# Patient Record
Sex: FEMALE | Race: WHITE | NOT HISPANIC OR LATINO | Employment: OTHER | URBAN - METROPOLITAN AREA
[De-identification: names, ages, dates, MRNs, and addresses within clinical notes are randomized per-mention and may not be internally consistent; named-entity substitution may affect disease eponyms.]

---

## 2017-01-24 ENCOUNTER — APPOINTMENT (OUTPATIENT)
Dept: WOMENS IMAGING | Facility: HOSPITAL | Age: 67
End: 2017-01-24

## 2017-01-24 PROCEDURE — 77080 DXA BONE DENSITY AXIAL: CPT

## 2017-01-24 PROCEDURE — 77063 BREAST TOMOSYNTHESIS BI: CPT | Performed by: RADIOLOGY

## 2017-01-24 PROCEDURE — 77080 DXA BONE DENSITY AXIAL: CPT | Performed by: RADIOLOGY

## 2017-01-24 PROCEDURE — G0202 SCR MAMMO BI INCL CAD: HCPCS | Performed by: RADIOLOGY

## 2017-08-16 DIAGNOSIS — Z71.84 TRAVEL ADVICE ENCOUNTER: Primary | ICD-10-CM

## 2017-08-16 DIAGNOSIS — Z71.84 TRAVEL ADVICE ENCOUNTER: ICD-10-CM

## 2017-08-16 RX ORDER — CIPROFLOXACIN 500 MG/1
500 TABLET, FILM COATED ORAL 2 TIMES DAILY
Qty: 20 TABLET | Refills: 0 | Status: SHIPPED | OUTPATIENT
Start: 2017-08-16 | End: 2017-09-22

## 2017-08-16 RX ORDER — CIPROFLOXACIN 500 MG/1
500 TABLET, FILM COATED ORAL 2 TIMES DAILY
Qty: 20 TABLET | Refills: 0 | Status: SHIPPED | OUTPATIENT
Start: 2017-08-16 | End: 2017-08-16 | Stop reason: SDUPTHER

## 2017-08-30 RX ORDER — OMEPRAZOLE 20 MG/1
CAPSULE, DELAYED RELEASE ORAL
Qty: 90 CAPSULE | Refills: 1 | Status: SHIPPED | OUTPATIENT
Start: 2017-08-30 | End: 2018-01-16 | Stop reason: SDUPTHER

## 2017-09-12 RX ORDER — CITALOPRAM 20 MG/1
TABLET ORAL
Qty: 90 TABLET | Refills: 1 | OUTPATIENT
Start: 2017-09-12

## 2017-09-22 ENCOUNTER — OFFICE VISIT (OUTPATIENT)
Dept: FAMILY MEDICINE CLINIC | Facility: CLINIC | Age: 67
End: 2017-09-22

## 2017-09-22 VITALS
WEIGHT: 143 LBS | OXYGEN SATURATION: 98 % | DIASTOLIC BLOOD PRESSURE: 64 MMHG | BODY MASS INDEX: 23.82 KG/M2 | HEIGHT: 65 IN | HEART RATE: 64 BPM | TEMPERATURE: 97.6 F | SYSTOLIC BLOOD PRESSURE: 110 MMHG

## 2017-09-22 DIAGNOSIS — Z23 NEED FOR PNEUMOCOCCAL VACCINATION: ICD-10-CM

## 2017-09-22 DIAGNOSIS — Z23 NEED FOR INFLUENZA VACCINATION: ICD-10-CM

## 2017-09-22 DIAGNOSIS — F41.8 MIXED ANXIETY DEPRESSIVE DISORDER: Primary | ICD-10-CM

## 2017-09-22 DIAGNOSIS — F51.04 CHRONIC INSOMNIA: ICD-10-CM

## 2017-09-22 PROCEDURE — 90670 PCV13 VACCINE IM: CPT | Performed by: NURSE PRACTITIONER

## 2017-09-22 PROCEDURE — 99214 OFFICE O/P EST MOD 30 MIN: CPT | Performed by: NURSE PRACTITIONER

## 2017-09-22 PROCEDURE — G0008 ADMIN INFLUENZA VIRUS VAC: HCPCS | Performed by: NURSE PRACTITIONER

## 2017-09-22 PROCEDURE — G0009 ADMIN PNEUMOCOCCAL VACCINE: HCPCS | Performed by: NURSE PRACTITIONER

## 2017-09-22 RX ORDER — CLONAZEPAM 0.5 MG/1
TABLET ORAL
Qty: 30 TABLET | Refills: 0 | Status: SHIPPED | OUTPATIENT
Start: 2017-09-22 | End: 2018-12-06 | Stop reason: SDUPTHER

## 2017-09-22 RX ORDER — CITALOPRAM 20 MG/1
20 TABLET ORAL DAILY
Qty: 90 TABLET | Refills: 3 | Status: SHIPPED | OUTPATIENT
Start: 2017-09-22 | End: 2017-12-18 | Stop reason: SDUPTHER

## 2017-09-22 NOTE — PROGRESS NOTES
"Subjective   Marianne Carrera is a 66 y.o. female.     History of Present Illness   Marianne Carrera 66 y.o. female presents for follow up of insomnia with onset of symptoms years ago. Patient describes symptoms as difficulty falling asleep. Patient has found complete relief with prescription sleep aid, Clonazepam. Associated symptoms include: fatigue if unable to take Rx . Patient denies daytime somnolence Symptoms have stabilized.  The patient has failed multiple OTC medications for insomnia.  They are well controlled on current Rx and will continue to try to take Rx PRN.      Also needing Celexa refill which she's taking daily as directed without any side effects and working well to control her anxiety and depression  The following portions of the patient's history were reviewed and updated as appropriate: allergies, current medications, past social history and problem list.    Review of Systems   Constitutional: Negative for fever.   All other systems reviewed and are negative.      Objective   /64 (BP Location: Left arm, Patient Position: Sitting)  Pulse 64  Temp 97.6 °F (36.4 °C)  Ht 65\" (165.1 cm)  Wt 143 lb (64.9 kg)  SpO2 98%  BMI 23.8 kg/m2  Physical Exam   Constitutional: She is oriented to person, place, and time. Vital signs are normal. She appears well-developed and well-nourished. No distress.   HENT:   Head: Normocephalic.   Cardiovascular: Normal rate, regular rhythm and normal heart sounds.    Pulmonary/Chest: Effort normal and breath sounds normal.   Neurological: She is alert and oriented to person, place, and time. Gait normal.   Psychiatric: She has a normal mood and affect. Her behavior is normal. Judgment and thought content normal.   Vitals reviewed.  The patient has read and signed the [facility] Controlled Substance Contract.  I will continue to see patient for regular follow up appointments.  They are well controlled on their medication.  HENRI has been reviewed by me and is " updated every 3 months. The patient is aware of the potential for addiction and dependence.      Assessment/Plan   Problem List Items Addressed This Visit        Other    Mixed anxiety depressive disorder - Primary    Relevant Medications    citalopram (CeleXA) 20 MG tablet    Chronic insomnia    Relevant Medications    clonazePAM (KLONOPIN) 0.5 MG tablet        rto in 3 mons

## 2017-10-20 VITALS
HEART RATE: 83 BPM | RESPIRATION RATE: 13 BRPM | WEIGHT: 144 LBS | OXYGEN SATURATION: 95 % | SYSTOLIC BLOOD PRESSURE: 144 MMHG | DIASTOLIC BLOOD PRESSURE: 100 MMHG | SYSTOLIC BLOOD PRESSURE: 148 MMHG | HEIGHT: 66 IN | OXYGEN SATURATION: 98 % | SYSTOLIC BLOOD PRESSURE: 138 MMHG | DIASTOLIC BLOOD PRESSURE: 82 MMHG | OXYGEN SATURATION: 84 % | OXYGEN SATURATION: 97 % | RESPIRATION RATE: 26 BRPM | HEART RATE: 69 BPM | OXYGEN SATURATION: 100 % | HEART RATE: 66 BPM | TEMPERATURE: 98 F | RESPIRATION RATE: 11 BRPM | HEART RATE: 85 BPM | DIASTOLIC BLOOD PRESSURE: 64 MMHG | RESPIRATION RATE: 16 BRPM | OXYGEN SATURATION: 96 % | SYSTOLIC BLOOD PRESSURE: 145 MMHG | SYSTOLIC BLOOD PRESSURE: 141 MMHG | SYSTOLIC BLOOD PRESSURE: 107 MMHG | DIASTOLIC BLOOD PRESSURE: 85 MMHG | DIASTOLIC BLOOD PRESSURE: 88 MMHG | DIASTOLIC BLOOD PRESSURE: 68 MMHG | RESPIRATION RATE: 20 BRPM | HEART RATE: 71 BPM | RESPIRATION RATE: 23 BRPM | SYSTOLIC BLOOD PRESSURE: 139 MMHG | DIASTOLIC BLOOD PRESSURE: 73 MMHG | DIASTOLIC BLOOD PRESSURE: 86 MMHG | HEART RATE: 65 BPM | RESPIRATION RATE: 17 BRPM | HEART RATE: 73 BPM | SYSTOLIC BLOOD PRESSURE: 171 MMHG | DIASTOLIC BLOOD PRESSURE: 93 MMHG | TEMPERATURE: 97.5 F | HEART RATE: 74 BPM

## 2017-10-23 ENCOUNTER — AMBULATORY SURGICAL CENTER (AMBULATORY)
Dept: URBAN - METROPOLITAN AREA SURGERY 17 | Facility: SURGERY | Age: 67
End: 2017-10-23
Payer: MEDICARE

## 2017-10-23 DIAGNOSIS — K57.30 DIVERTICULOSIS OF LARGE INTESTINE WITHOUT PERFORATION OR ABS: ICD-10-CM

## 2017-10-23 DIAGNOSIS — Z12.11 ENCOUNTER FOR SCREENING FOR MALIGNANT NEOPLASM OF COLON: ICD-10-CM

## 2017-10-23 DIAGNOSIS — K21.9 GASTRO-ESOPHAGEAL REFLUX DISEASE WITHOUT ESOPHAGITIS: ICD-10-CM

## 2017-10-23 DIAGNOSIS — K44.9 DIAPHRAGMATIC HERNIA WITHOUT OBSTRUCTION OR GANGRENE: ICD-10-CM

## 2017-10-23 PROCEDURE — G0121 COLON CA SCRN NOT HI RSK IND: HCPCS

## 2017-10-23 PROCEDURE — 43235 EGD DIAGNOSTIC BRUSH WASH: CPT

## 2017-10-23 RX ADMIN — LIDOCAINE HYDROCHLORIDE 50 MG: 10 INJECTION, SOLUTION EPIDURAL; INFILTRATION; INTRACAUDAL; PERINEURAL at 12:04

## 2017-10-23 RX ADMIN — PROPOFOL 75 MG: 10 INJECTION, EMULSION INTRAVENOUS at 12:04

## 2017-10-23 RX ADMIN — PROPOFOL 50 MG: 10 INJECTION, EMULSION INTRAVENOUS at 12:11

## 2017-10-23 RX ADMIN — PROPOFOL 25 MG: 10 INJECTION, EMULSION INTRAVENOUS at 12:08

## 2017-10-23 RX ADMIN — PROPOFOL 50 MG: 10 INJECTION, EMULSION INTRAVENOUS at 12:05

## 2017-12-18 DIAGNOSIS — F41.8 MIXED ANXIETY DEPRESSIVE DISORDER: ICD-10-CM

## 2017-12-19 RX ORDER — CITALOPRAM 20 MG/1
TABLET ORAL
Qty: 90 TABLET | Refills: 1 | Status: SHIPPED | OUTPATIENT
Start: 2017-12-19 | End: 2018-05-08 | Stop reason: SDUPTHER

## 2017-12-22 ENCOUNTER — TELEPHONE (OUTPATIENT)
Dept: FAMILY MEDICINE CLINIC | Facility: CLINIC | Age: 67
End: 2017-12-22

## 2017-12-22 RX ORDER — AMOXICILLIN 875 MG/1
875 TABLET, COATED ORAL 2 TIMES DAILY
Qty: 20 TABLET | Refills: 0 | Status: SHIPPED | OUTPATIENT
Start: 2017-12-22 | End: 2018-12-06

## 2017-12-22 NOTE — TELEPHONE ENCOUNTER
Patient left message asking if she can get a prescription for a steroid or antibiotic for her sore throat?

## 2018-01-17 RX ORDER — OMEPRAZOLE 20 MG/1
CAPSULE, DELAYED RELEASE ORAL
Qty: 90 CAPSULE | Refills: 1 | Status: SHIPPED | OUTPATIENT
Start: 2018-01-17 | End: 2018-06-11 | Stop reason: SDUPTHER

## 2018-04-17 ENCOUNTER — APPOINTMENT (OUTPATIENT)
Dept: WOMENS IMAGING | Facility: HOSPITAL | Age: 68
End: 2018-04-17

## 2018-04-17 PROCEDURE — 77066 DX MAMMO INCL CAD BI: CPT | Performed by: RADIOLOGY

## 2018-04-17 PROCEDURE — G0279 TOMOSYNTHESIS, MAMMO: HCPCS | Performed by: RADIOLOGY

## 2018-04-17 PROCEDURE — 76641 ULTRASOUND BREAST COMPLETE: CPT | Performed by: RADIOLOGY

## 2018-05-08 DIAGNOSIS — F41.8 MIXED ANXIETY DEPRESSIVE DISORDER: ICD-10-CM

## 2018-05-09 RX ORDER — CITALOPRAM 20 MG/1
TABLET ORAL
Qty: 90 TABLET | Refills: 1 | Status: SHIPPED | OUTPATIENT
Start: 2018-05-09 | End: 2018-12-06 | Stop reason: SDUPTHER

## 2018-06-12 RX ORDER — OMEPRAZOLE 20 MG/1
CAPSULE, DELAYED RELEASE ORAL
Qty: 90 CAPSULE | Refills: 1 | Status: SHIPPED | OUTPATIENT
Start: 2018-06-12 | End: 2018-12-06 | Stop reason: SDUPTHER

## 2018-09-10 ENCOUNTER — TELEPHONE (OUTPATIENT)
Dept: FAMILY MEDICINE CLINIC | Facility: CLINIC | Age: 68
End: 2018-09-10

## 2018-09-10 RX ORDER — CIPROFLOXACIN 500 MG/1
500 TABLET, FILM COATED ORAL 2 TIMES DAILY
Qty: 20 TABLET | Refills: 0 | Status: SHIPPED | OUTPATIENT
Start: 2018-09-10 | End: 2018-12-06

## 2018-09-10 NOTE — TELEPHONE ENCOUNTER
Pt is leaving [facility] next week. Pt requesting antibiotics to take with them incase they get sick. Pt mentioned that this was done last year.

## 2018-10-08 DIAGNOSIS — F41.8 MIXED ANXIETY DEPRESSIVE DISORDER: ICD-10-CM

## 2018-10-09 RX ORDER — CITALOPRAM 20 MG/1
TABLET ORAL
Qty: 90 TABLET | Refills: 1 | OUTPATIENT
Start: 2018-10-09

## 2018-11-27 DIAGNOSIS — F41.8 MIXED ANXIETY DEPRESSIVE DISORDER: ICD-10-CM

## 2018-11-27 RX ORDER — OMEPRAZOLE 20 MG/1
CAPSULE, DELAYED RELEASE ORAL
Qty: 90 CAPSULE | Refills: 1 | OUTPATIENT
Start: 2018-11-27

## 2018-11-27 RX ORDER — CITALOPRAM 20 MG/1
TABLET ORAL
Qty: 90 TABLET | Refills: 1 | OUTPATIENT
Start: 2018-11-27

## 2018-12-06 ENCOUNTER — OFFICE VISIT (OUTPATIENT)
Dept: FAMILY MEDICINE CLINIC | Facility: CLINIC | Age: 68
End: 2018-12-06

## 2018-12-06 VITALS
TEMPERATURE: 98.5 F | DIASTOLIC BLOOD PRESSURE: 86 MMHG | BODY MASS INDEX: 23.02 KG/M2 | HEIGHT: 67 IN | SYSTOLIC BLOOD PRESSURE: 138 MMHG | HEART RATE: 62 BPM | OXYGEN SATURATION: 99 % | WEIGHT: 146.7 LBS

## 2018-12-06 DIAGNOSIS — K21.9 GASTROESOPHAGEAL REFLUX DISEASE WITHOUT ESOPHAGITIS: Primary | ICD-10-CM

## 2018-12-06 DIAGNOSIS — M19.90 ARTHRITIS: ICD-10-CM

## 2018-12-06 DIAGNOSIS — Z13.0 SCREENING FOR DISORDER OF BLOOD AND BLOOD-FORMING ORGANS: ICD-10-CM

## 2018-12-06 DIAGNOSIS — R53.83 FATIGUE, UNSPECIFIED TYPE: ICD-10-CM

## 2018-12-06 DIAGNOSIS — E78.5 HYPERLIPIDEMIA, UNSPECIFIED HYPERLIPIDEMIA TYPE: ICD-10-CM

## 2018-12-06 DIAGNOSIS — F41.8 MIXED ANXIETY DEPRESSIVE DISORDER: ICD-10-CM

## 2018-12-06 DIAGNOSIS — F51.04 CHRONIC INSOMNIA: ICD-10-CM

## 2018-12-06 LAB
ALBUMIN SERPL-MCNC: 4.5 G/DL (ref 3.5–5.2)
ALBUMIN/GLOB SERPL: 1.9 G/DL
ALP SERPL-CCNC: 57 U/L (ref 39–117)
ALT SERPL-CCNC: 15 U/L (ref 1–33)
AST SERPL-CCNC: 23 U/L (ref 1–32)
BASOPHILS # BLD AUTO: 0.03 10*3/MM3 (ref 0–0.2)
BASOPHILS NFR BLD AUTO: 0.8 % (ref 0–1.5)
BILIRUB SERPL-MCNC: 0.4 MG/DL (ref 0.1–1.2)
BUN SERPL-MCNC: 13 MG/DL (ref 8–23)
BUN/CREAT SERPL: 15.1 (ref 7–25)
CALCIUM SERPL-MCNC: 10.1 MG/DL (ref 8.6–10.5)
CHLORIDE SERPL-SCNC: 101 MMOL/L (ref 98–107)
CHOLEST SERPL-MCNC: 280 MG/DL (ref 0–200)
CO2 SERPL-SCNC: 30.4 MMOL/L (ref 22–29)
CREAT SERPL-MCNC: 0.86 MG/DL (ref 0.57–1)
EOSINOPHIL # BLD AUTO: 0.14 10*3/MM3 (ref 0–0.7)
EOSINOPHIL NFR BLD AUTO: 3.6 % (ref 0.3–6.2)
ERYTHROCYTE [DISTWIDTH] IN BLOOD BY AUTOMATED COUNT: 13.4 % (ref 11.7–13)
GLOBULIN SER CALC-MCNC: 2.4 GM/DL
GLUCOSE SERPL-MCNC: 80 MG/DL (ref 65–99)
HCT VFR BLD AUTO: 45.2 % (ref 35.6–45.5)
HDLC SERPL-MCNC: 86 MG/DL (ref 40–60)
HGB BLD-MCNC: 14.2 G/DL (ref 11.9–15.5)
IMM GRANULOCYTES # BLD: 0 10*3/MM3 (ref 0–0.03)
IMM GRANULOCYTES NFR BLD: 0 % (ref 0–0.5)
LDLC SERPL CALC-MCNC: 173 MG/DL (ref 0–100)
LDLC/HDLC SERPL: 2.01 {RATIO}
LYMPHOCYTES # BLD AUTO: 1.39 10*3/MM3 (ref 0.9–4.8)
LYMPHOCYTES NFR BLD AUTO: 36.2 % (ref 19.6–45.3)
MCH RBC QN AUTO: 32.9 PG (ref 26.9–32)
MCHC RBC AUTO-ENTMCNC: 31.4 G/DL (ref 32.4–36.3)
MCV RBC AUTO: 104.6 FL (ref 80.5–98.2)
MONOCYTES # BLD AUTO: 0.45 10*3/MM3 (ref 0.2–1.2)
MONOCYTES NFR BLD AUTO: 11.7 % (ref 5–12)
NEUTROPHILS # BLD AUTO: 1.83 10*3/MM3 (ref 1.9–8.1)
NEUTROPHILS NFR BLD AUTO: 47.7 % (ref 42.7–76)
PLATELET # BLD AUTO: 271 10*3/MM3 (ref 140–500)
POTASSIUM SERPL-SCNC: 4.7 MMOL/L (ref 3.5–5.2)
PROT SERPL-MCNC: 6.9 G/DL (ref 6–8.5)
RBC # BLD AUTO: 4.32 10*6/MM3 (ref 3.9–5.2)
SODIUM SERPL-SCNC: 142 MMOL/L (ref 136–145)
TRIGL SERPL-MCNC: 106 MG/DL (ref 0–150)
TSH SERPL DL<=0.005 MIU/L-ACNC: 1.55 MIU/ML (ref 0.27–4.2)
VLDLC SERPL CALC-MCNC: 21.2 MG/DL (ref 5–40)
WBC # BLD AUTO: 3.84 10*3/MM3 (ref 4.5–10.7)

## 2018-12-06 PROCEDURE — 99214 OFFICE O/P EST MOD 30 MIN: CPT | Performed by: NURSE PRACTITIONER

## 2018-12-06 RX ORDER — CLONAZEPAM 0.5 MG/1
TABLET ORAL
Qty: 30 TABLET | Refills: 0 | Status: SHIPPED | OUTPATIENT
Start: 2018-12-06 | End: 2022-08-11

## 2018-12-06 RX ORDER — CITALOPRAM 20 MG/1
20 TABLET ORAL DAILY
Qty: 90 TABLET | Refills: 3 | Status: SHIPPED | OUTPATIENT
Start: 2018-12-06 | End: 2019-10-11 | Stop reason: SDUPTHER

## 2018-12-06 RX ORDER — OMEPRAZOLE 20 MG/1
20 CAPSULE, DELAYED RELEASE ORAL DAILY
Qty: 90 CAPSULE | Refills: 3 | Status: SHIPPED | OUTPATIENT
Start: 2018-12-06 | End: 2019-10-11 | Stop reason: SDUPTHER

## 2018-12-06 RX ORDER — MELOXICAM 15 MG/1
15 TABLET ORAL DAILY
Qty: 90 TABLET | Refills: 3 | Status: SHIPPED | OUTPATIENT
Start: 2018-12-06 | End: 2019-10-11 | Stop reason: SDUPTHER

## 2018-12-06 NOTE — PROGRESS NOTES
"Subjective   Marianne Carrera is a 68 y.o. female.     History of Present Illness   Marianne Carrera 68 y.o. female who presents today for routine follow up check and medication refills.  she has a history of   Patient Active Problem List   Diagnosis   • Carpal tunnel syndrome   • Mixed anxiety depressive disorder   • Gastroesophageal reflux disease   • Hyperlipidemia   • Chronic insomnia   • Menopause present   • Osteopenia   • Raynaud's phenomenon   • Screening for disorder of blood and blood-forming organs   • Fatigue   • Arthritis   .  Since the last visit, she has overall felt well.  She has GERD and is well controlled on PPI medication and anxiety is under control, insomnia is under control..  she has been compliant with current medications have reviewed them.  The patient denies medication side effects.  Having bone aches in the morning but after gets up feels better within an hour. Nottried any medications    Having fatigue and wanting tsh checked    Results for orders placed or performed in visit on 04/18/16   POCT Urinalysis Dipstick, Automated   Result Value Ref Range    Color Yellow Yellow, Straw, Dark Yellow, Michelle    Clarity, UA Clear Clear    Glucose, UA Negative Negative mg/dL    Bilirubin Negative Negative    Ketones, UA Negative Negative    Specific Gravity  1.015 1.005 - 1.030    Blood, UA Negative Negative    pH, Urine 5.5 5.0 - 8.0    Protein, POC Negative Negative mg/dL    Urobilinogen, UA Normal Normal    Leukocytes Negative Negative    Nitrite, UA Negative Negative         The following portions of the patient's history were reviewed and updated as appropriate: allergies, current medications, past social history and problem list.    Review of Systems   All other systems reviewed and are negative.      Objective   /86 (BP Location: Right arm, Patient Position: Sitting)   Pulse 62   Temp 98.5 °F (36.9 °C)   Ht 170.2 cm (67\")   Wt 66.5 kg (146 lb 11.2 oz)   SpO2 99%   BMI 22.98 kg/m² "   Physical Exam   Constitutional: She is oriented to person, place, and time. Vital signs are normal. She appears well-developed and well-nourished. No distress.   HENT:   Head: Normocephalic.   Cardiovascular: Normal rate, regular rhythm and normal heart sounds.   Pulmonary/Chest: Effort normal and breath sounds normal.   Neurological: She is alert and oriented to person, place, and time. Gait normal.   Psychiatric: She has a normal mood and affect. Her behavior is normal. Judgment and thought content normal.   Vitals reviewed.      Assessment/Plan      Diagnosis Plan   1. Gastroesophageal reflux disease without esophagitis  omeprazole (priLOSEC) 20 MG capsule   2. Chronic insomnia  clonazePAM (KLONOPIN) 0.5 MG tablet   3. Mixed anxiety depressive disorder  citalopram (CeleXA) 20 MG tablet   4. Hyperlipidemia, unspecified hyperlipidemia type  Comprehensive Metabolic Panel    Lipid Panel With LDL / HDL Ratio   5. Screening for disorder of blood and blood-forming organs  CBC & Differential   6. Fatigue, unspecified type  TSH   7. Arthritis  meloxicam (MOBIC) 15 MG tablet     Follow up after labs   Cont same with gloria Wilkes, KETTY  12/6/2018

## 2018-12-07 RX ORDER — SIMVASTATIN 20 MG
20 TABLET ORAL NIGHTLY
Qty: 90 TABLET | Refills: 3 | Status: SHIPPED | OUTPATIENT
Start: 2018-12-07 | End: 2019-10-11 | Stop reason: SDUPTHER

## 2019-03-21 ENCOUNTER — TELEPHONE (OUTPATIENT)
Dept: FAMILY MEDICINE CLINIC | Facility: CLINIC | Age: 69
End: 2019-03-21

## 2019-03-21 DIAGNOSIS — Z78.0 POST-MENOPAUSAL: ICD-10-CM

## 2019-03-21 DIAGNOSIS — Z13.820 OSTEOPOROSIS SCREENING: Primary | ICD-10-CM

## 2019-03-21 NOTE — TELEPHONE ENCOUNTER
What diagnosis do I use? She does not have a history of osteoporosis or osteopenia and medicare wont cover osteoporosis screening

## 2019-03-21 NOTE — TELEPHONE ENCOUNTER
Pt is scheduled on 4/18 at the [facility] for pt's routine mammogram. Is it possible for Shan to authorize a bone density order as well and pt can get both those tests done on the same day?    If authorized, please fax order to [phone]

## 2019-04-19 ENCOUNTER — APPOINTMENT (OUTPATIENT)
Dept: WOMENS IMAGING | Facility: HOSPITAL | Age: 69
End: 2019-04-19

## 2019-04-19 PROCEDURE — 77063 BREAST TOMOSYNTHESIS BI: CPT | Performed by: RADIOLOGY

## 2019-04-19 PROCEDURE — 77067 SCR MAMMO BI INCL CAD: CPT | Performed by: RADIOLOGY

## 2019-04-19 PROCEDURE — 77080 DXA BONE DENSITY AXIAL: CPT | Performed by: RADIOLOGY

## 2019-10-11 DIAGNOSIS — M19.90 ARTHRITIS: ICD-10-CM

## 2019-10-11 DIAGNOSIS — F41.8 MIXED ANXIETY DEPRESSIVE DISORDER: ICD-10-CM

## 2019-10-11 DIAGNOSIS — K21.9 GASTROESOPHAGEAL REFLUX DISEASE WITHOUT ESOPHAGITIS: ICD-10-CM

## 2019-10-11 RX ORDER — CITALOPRAM 20 MG/1
TABLET ORAL
Qty: 90 TABLET | Refills: 3 | Status: SHIPPED | OUTPATIENT
Start: 2019-10-11 | End: 2020-08-13

## 2019-10-11 RX ORDER — SIMVASTATIN 20 MG
TABLET ORAL
Qty: 90 TABLET | Refills: 3 | Status: SHIPPED | OUTPATIENT
Start: 2019-10-11 | End: 2019-12-12

## 2019-10-11 RX ORDER — MELOXICAM 15 MG/1
TABLET ORAL
Qty: 90 TABLET | Refills: 3 | Status: SHIPPED | OUTPATIENT
Start: 2019-10-11 | End: 2019-12-12

## 2019-10-11 RX ORDER — OMEPRAZOLE 20 MG/1
CAPSULE, DELAYED RELEASE ORAL
Qty: 90 CAPSULE | Refills: 3 | Status: SHIPPED | OUTPATIENT
Start: 2019-10-11 | End: 2021-04-08

## 2019-12-12 ENCOUNTER — OFFICE VISIT (OUTPATIENT)
Dept: FAMILY MEDICINE CLINIC | Facility: CLINIC | Age: 69
End: 2019-12-12

## 2019-12-12 VITALS
HEIGHT: 64 IN | TEMPERATURE: 98.1 F | HEART RATE: 78 BPM | WEIGHT: 142 LBS | OXYGEN SATURATION: 98 % | DIASTOLIC BLOOD PRESSURE: 78 MMHG | BODY MASS INDEX: 24.24 KG/M2 | SYSTOLIC BLOOD PRESSURE: 136 MMHG

## 2019-12-12 DIAGNOSIS — Z13.1 SCREENING FOR DIABETES MELLITUS: ICD-10-CM

## 2019-12-12 DIAGNOSIS — G47.00 INSOMNIA, UNSPECIFIED TYPE: ICD-10-CM

## 2019-12-12 DIAGNOSIS — F41.8 MIXED ANXIETY DEPRESSIVE DISORDER: ICD-10-CM

## 2019-12-12 DIAGNOSIS — K21.9 GASTROESOPHAGEAL REFLUX DISEASE WITHOUT ESOPHAGITIS: ICD-10-CM

## 2019-12-12 DIAGNOSIS — Z13.0 SCREENING FOR IRON DEFICIENCY ANEMIA: ICD-10-CM

## 2019-12-12 DIAGNOSIS — E78.5 HYPERLIPIDEMIA, UNSPECIFIED HYPERLIPIDEMIA TYPE: Primary | ICD-10-CM

## 2019-12-12 PROCEDURE — 99214 OFFICE O/P EST MOD 30 MIN: CPT | Performed by: NURSE PRACTITIONER

## 2019-12-12 RX ORDER — DOXEPIN HYDROCHLORIDE 10 MG/1
10 CAPSULE ORAL NIGHTLY
Qty: 30 CAPSULE | Refills: 0 | Status: SHIPPED | OUTPATIENT
Start: 2019-12-12 | End: 2021-06-08

## 2019-12-12 NOTE — PROGRESS NOTES
"Subjective   Marianne Carrera is a 69 y.o. female.     History of Present Illness    Since the last visit, she has overall felt well.  She has GERD controlled on PPI Rx and anxiety is wellcontrolled, lipid med is causing leg muslce pain..  she has been compliant with current medications have reviewed them.  The patient denies medication side effects.  Will refill medications. /78   Pulse 78   Temp 98.1 °F (36.7 °C) (Oral)   Ht 162.6 cm (64\")   Wt 64.4 kg (142 lb)   SpO2 98%   BMI 24.37 kg/m²      She has been experiencing insomnia and in 2015 she used clonazepam for this and she would like a refill for it today.  Discussed with her at length about the clonazepam and not wanting to use that due to the fact that it is a controlled and possibly addictive medication patient is willing to try something else.    Results for orders placed or performed in visit on 12/06/18   Comprehensive Metabolic Panel   Result Value Ref Range    Glucose 80 65 - 99 mg/dL    BUN 13 8 - 23 mg/dL    Creatinine 0.86 0.57 - 1.00 mg/dL    eGFR Non African Am 66 >60 mL/min/1.73    eGFR African Am 80 >60 mL/min/1.73    BUN/Creatinine Ratio 15.1 7.0 - 25.0    Sodium 142 136 - 145 mmol/L    Potassium 4.7 3.5 - 5.2 mmol/L    Chloride 101 98 - 107 mmol/L    Total CO2 30.4 (H) 22.0 - 29.0 mmol/L    Calcium 10.1 8.6 - 10.5 mg/dL    Total Protein 6.9 6.0 - 8.5 g/dL    Albumin 4.50 3.50 - 5.20 g/dL    Globulin 2.4 gm/dL    A/G Ratio 1.9 g/dL    Total Bilirubin 0.4 0.1 - 1.2 mg/dL    Alkaline Phosphatase 57 39 - 117 U/L    AST (SGOT) 23 1 - 32 U/L    ALT (SGPT) 15 1 - 33 U/L   Lipid Panel With LDL / HDL Ratio   Result Value Ref Range    Total Cholesterol 280 (H) 0 - 200 mg/dL    Triglycerides 106 0 - 150 mg/dL    HDL Cholesterol 86 (H) 40 - 60 mg/dL    VLDL Cholesterol 21.2 5 - 40 mg/dL    LDL Cholesterol  173 (H) 0 - 100 mg/dL    LDL/HDL Ratio 2.01    TSH   Result Value Ref Range    TSH 1.550 0.270 - 4.200 mIU/mL   CBC & Differential   Result " "Value Ref Range    WBC 3.84 (L) 4.50 - 10.70 10*3/mm3    RBC 4.32 3.90 - 5.20 10*6/mm3    Hemoglobin 14.2 11.9 - 15.5 g/dL    Hematocrit 45.2 35.6 - 45.5 %    .6 (H) 80.5 - 98.2 fL    MCH 32.9 (H) 26.9 - 32.0 pg    MCHC 31.4 (L) 32.4 - 36.3 g/dL    RDW 13.4 (H) 11.7 - 13.0 %    Platelets 271 140 - 500 10*3/mm3    Neutrophil Rel % 47.7 42.7 - 76.0 %    Lymphocyte Rel % 36.2 19.6 - 45.3 %    Monocyte Rel % 11.7 5.0 - 12.0 %    Eosinophil Rel % 3.6 0.3 - 6.2 %    Basophil Rel % 0.8 0.0 - 1.5 %    Neutrophils Absolute 1.83 (L) 1.90 - 8.10 10*3/mm3    Lymphocytes Absolute 1.39 0.90 - 4.80 10*3/mm3    Monocytes Absolute 0.45 0.20 - 1.20 10*3/mm3    Eosinophils Absolute 0.14 0.00 - 0.70 10*3/mm3    Basophils Absolute 0.03 0.00 - 0.20 10*3/mm3    Immature Granulocyte Rel % 0.0 0.0 - 0.5 %    Immature Grans Absolute 0.00 0.00 - 0.03 10*3/mm3         The following portions of the patient's history were reviewed and updated as appropriate: allergies, current medications, past social history and problem list.    Review of Systems   Musculoskeletal: Positive for myalgias.   All other systems reviewed and are negative.      Objective   /78   Pulse 78   Temp 98.1 °F (36.7 °C) (Oral)   Ht 162.6 cm (64\")   Wt 64.4 kg (142 lb)   SpO2 98%   BMI 24.37 kg/m²   Physical Exam   Constitutional: She is oriented to person, place, and time. Vital signs are normal. She appears well-developed and well-nourished. No distress.   HENT:   Head: Normocephalic.   Cardiovascular: Normal rate, regular rhythm and normal heart sounds.   Pulmonary/Chest: Effort normal and breath sounds normal.   Neurological: She is alert and oriented to person, place, and time. Gait normal.   Psychiatric: She has a normal mood and affect. Her behavior is normal. Judgment and thought content normal.   Vitals reviewed.      Assessment/Plan      Diagnosis Plan   1. Hyperlipidemia, unspecified hyperlipidemia type  Comprehensive Metabolic Panel    Lipid " Panel With LDL / HDL Ratio   2. Gastroesophageal reflux disease without esophagitis     3. Mixed anxiety depressive disorder     4. Screening for iron deficiency anemia  CBC & Differential   5. Screening for diabetes mellitus  Comprehensive Metabolic Panel   6. Insomnia, unspecified type  doxepin (SINEquan) 10 MG capsule     Follow up after labs   Stop zocor and call in 3-4 weeks with results.  If muscle aches are better we will change meds.    Shan Wilkes, APRN  12/12/2019

## 2019-12-13 LAB
ALBUMIN SERPL-MCNC: 4.7 G/DL (ref 3.5–5.2)
ALBUMIN/GLOB SERPL: 2.1 G/DL
ALP SERPL-CCNC: 52 U/L (ref 39–117)
ALT SERPL-CCNC: 12 U/L (ref 1–33)
AST SERPL-CCNC: 18 U/L (ref 1–32)
BASOPHILS # BLD AUTO: 0.04 10*3/MM3 (ref 0–0.2)
BASOPHILS NFR BLD AUTO: 1 % (ref 0–1.5)
BILIRUB SERPL-MCNC: 0.4 MG/DL (ref 0.2–1.2)
BUN SERPL-MCNC: 21 MG/DL (ref 8–23)
BUN/CREAT SERPL: 24.4 (ref 7–25)
CALCIUM SERPL-MCNC: 9.6 MG/DL (ref 8.6–10.5)
CHLORIDE SERPL-SCNC: 100 MMOL/L (ref 98–107)
CHOLEST SERPL-MCNC: 214 MG/DL (ref 0–200)
CO2 SERPL-SCNC: 27.7 MMOL/L (ref 22–29)
CREAT SERPL-MCNC: 0.86 MG/DL (ref 0.57–1)
EOSINOPHIL # BLD AUTO: 0.2 10*3/MM3 (ref 0–0.4)
EOSINOPHIL NFR BLD AUTO: 5.1 % (ref 0.3–6.2)
ERYTHROCYTE [DISTWIDTH] IN BLOOD BY AUTOMATED COUNT: 12.7 % (ref 12.3–15.4)
GLOBULIN SER CALC-MCNC: 2.2 GM/DL
GLUCOSE SERPL-MCNC: 78 MG/DL (ref 65–99)
HCT VFR BLD AUTO: 41 % (ref 34–46.6)
HDLC SERPL-MCNC: 97 MG/DL (ref 40–60)
HGB BLD-MCNC: 14 G/DL (ref 12–15.9)
IMM GRANULOCYTES # BLD AUTO: 0.01 10*3/MM3 (ref 0–0.05)
IMM GRANULOCYTES NFR BLD AUTO: 0.3 % (ref 0–0.5)
LDLC SERPL CALC-MCNC: 104 MG/DL (ref 0–100)
LDLC/HDLC SERPL: 1.08 {RATIO}
LYMPHOCYTES # BLD AUTO: 1.33 10*3/MM3 (ref 0.7–3.1)
LYMPHOCYTES NFR BLD AUTO: 34.2 % (ref 19.6–45.3)
MCH RBC QN AUTO: 33.7 PG (ref 26.6–33)
MCHC RBC AUTO-ENTMCNC: 34.1 G/DL (ref 31.5–35.7)
MCV RBC AUTO: 98.6 FL (ref 79–97)
MONOCYTES # BLD AUTO: 0.52 10*3/MM3 (ref 0.1–0.9)
MONOCYTES NFR BLD AUTO: 13.4 % (ref 5–12)
NEUTROPHILS # BLD AUTO: 1.79 10*3/MM3 (ref 1.7–7)
NEUTROPHILS NFR BLD AUTO: 46 % (ref 42.7–76)
NRBC BLD AUTO-RTO: 0 /100 WBC (ref 0–0.2)
PLATELET # BLD AUTO: 211 10*3/MM3 (ref 140–450)
POTASSIUM SERPL-SCNC: 5.2 MMOL/L (ref 3.5–5.2)
PROT SERPL-MCNC: 6.9 G/DL (ref 6–8.5)
RBC # BLD AUTO: 4.16 10*6/MM3 (ref 3.77–5.28)
SODIUM SERPL-SCNC: 140 MMOL/L (ref 136–145)
TRIGL SERPL-MCNC: 63 MG/DL (ref 0–150)
VLDLC SERPL CALC-MCNC: 12.6 MG/DL
WBC # BLD AUTO: 3.89 10*3/MM3 (ref 3.4–10.8)

## 2020-03-13 ENCOUNTER — TELEPHONE (OUTPATIENT)
Dept: FAMILY MEDICINE CLINIC | Facility: CLINIC | Age: 70
End: 2020-03-13

## 2020-03-13 DIAGNOSIS — Z12.31 ENCOUNTER FOR SCREENING MAMMOGRAM FOR MALIGNANT NEOPLASM OF BREAST: ICD-10-CM

## 2020-03-13 DIAGNOSIS — Z12.39 BREAST CANCER SCREENING: Primary | ICD-10-CM

## 2020-03-13 NOTE — TELEPHONE ENCOUNTER
Pt called for an order for her mammogram. She goes to [facility]

## 2020-06-01 ENCOUNTER — APPOINTMENT (OUTPATIENT)
Dept: WOMENS IMAGING | Facility: HOSPITAL | Age: 70
End: 2020-06-01

## 2020-06-01 PROCEDURE — 77067 SCR MAMMO BI INCL CAD: CPT | Performed by: RADIOLOGY

## 2020-06-01 PROCEDURE — 77063 BREAST TOMOSYNTHESIS BI: CPT | Performed by: RADIOLOGY

## 2020-08-13 DIAGNOSIS — F41.8 MIXED ANXIETY DEPRESSIVE DISORDER: ICD-10-CM

## 2020-08-13 RX ORDER — CITALOPRAM 20 MG/1
TABLET ORAL
Qty: 90 TABLET | Refills: 3 | Status: SHIPPED | OUTPATIENT
Start: 2020-08-13 | End: 2021-06-04

## 2020-12-08 ENCOUNTER — OFFICE VISIT (OUTPATIENT)
Dept: FAMILY MEDICINE CLINIC | Facility: CLINIC | Age: 70
End: 2020-12-08

## 2020-12-08 VITALS
OXYGEN SATURATION: 95 % | HEART RATE: 92 BPM | HEIGHT: 64 IN | TEMPERATURE: 97.1 F | SYSTOLIC BLOOD PRESSURE: 142 MMHG | DIASTOLIC BLOOD PRESSURE: 70 MMHG | BODY MASS INDEX: 23.63 KG/M2 | WEIGHT: 138.4 LBS

## 2020-12-08 DIAGNOSIS — G89.29 CHRONIC BILATERAL LOW BACK PAIN WITHOUT SCIATICA: ICD-10-CM

## 2020-12-08 DIAGNOSIS — E78.5 HYPERLIPIDEMIA, UNSPECIFIED HYPERLIPIDEMIA TYPE: ICD-10-CM

## 2020-12-08 DIAGNOSIS — Z13.1 SCREENING FOR DIABETES MELLITUS: ICD-10-CM

## 2020-12-08 DIAGNOSIS — E53.9 VITAMIN B DEFICIENCY: ICD-10-CM

## 2020-12-08 DIAGNOSIS — Z00.00 MEDICARE ANNUAL WELLNESS VISIT, INITIAL: Primary | ICD-10-CM

## 2020-12-08 DIAGNOSIS — Z13.0 SCREENING FOR IRON DEFICIENCY ANEMIA: ICD-10-CM

## 2020-12-08 DIAGNOSIS — M54.50 CHRONIC BILATERAL LOW BACK PAIN WITHOUT SCIATICA: ICD-10-CM

## 2020-12-08 PROCEDURE — G0438 PPPS, INITIAL VISIT: HCPCS | Performed by: NURSE PRACTITIONER

## 2020-12-08 NOTE — PROGRESS NOTES
The ABCs of the Annual Wellness Visit  Initial Medicare Wellness Visit    Chief Complaint   Patient presents with   • Medicare Wellness-Initial Visit     Patient is fasting to have her labs drawn she is wanting her b12 level checked ( wore mask and goggles)        Subjective   History of Present Illness:  Marianne Carrera is a 70 y.o. female who presents for an Initial Medicare Wellness Visit.    HEALTH RISK ASSESSMENT    Recent Hospitalizations:  No hospitalization(s) within the last year.    Current Medical Providers:  Patient Care Team:  Shan Wilkes APRN as PCP - General (Family Medicine)  Barbara Zhang MD (Inactive) as Consulting Physician (Gynecology)    Smoking Status:  Social History     Tobacco Use   Smoking Status Former Smoker   Smokeless Tobacco Former User       Alcohol Consumption:  Social History     Substance and Sexual Activity   Alcohol Use Yes       Depression Screen:   PHQ-2/PHQ-9 Depression Screening 12/8/2020   Little interest or pleasure in doing things 0   Feeling down, depressed, or hopeless 0   Total Score 0       Fall Risk Screen:  LUZADI Fall Risk Assessment has not been completed.    Health Habits and Functional and Cognitive Screening:  Functional & Cognitive Status 12/8/2020   Do you have difficulty preparing food and eating? No   Do you have difficulty bathing yourself, getting dressed or grooming yourself? No   Do you have difficulty using the toilet? No   Do you have difficulty moving around from place to place? No   Do you have trouble with steps or getting out of a bed or a chair? No   Current Diet Well Balanced Diet   Dental Exam Up to date   Eye Exam Up to date   Exercise (times per week) 7 times per week   Current Exercise Activities Include Walking   Do you need help using the phone?  No   Are you deaf or do you have serious difficulty hearing?  No   Do you need help with transportation? No   Do you need help shopping? No   Do you need help preparing meals?  No   Do you need help  with housework?  No   Do you need help with laundry? No   Do you need help taking your medications? No   Do you need help managing money? No   Do you ever drive or ride in a car without wearing a seat belt? No         Does the patient have evidence of cognitive impairment? No    Asprin use counseling:Taking ASA appropriately as indicated    Age-appropriate Screening Schedule:  Refer to the list below for future screening recommendations based on patient's age, sex and/or medical conditions. Orders for these recommended tests are listed in the plan section. The patient has been provided with a written plan.    Health Maintenance   Topic Date Due   • ZOSTER VACCINE (2 of 3) 01/10/2016   • DXA SCAN  08/25/2016   • MAMMOGRAM  12/01/2017   • LIPID PANEL  12/12/2020   • PAP SMEAR  12/01/2023   • TDAP/TD VACCINES (4 - Td) 09/13/2026   • COLONOSCOPY  10/23/2027   • INFLUENZA VACCINE  Completed          The following portions of the patient's history were reviewed and updated as appropriate: allergies, current medications, past family history, past medical history, past social history, past surgical history and problem list.      Pt still complaining of back pain and requesting a referral to PT.    Outpatient Medications Prior to Visit   Medication Sig Dispense Refill   • aspirin 81 MG tablet Take  by mouth.     • Calcium Carb-Cholecalciferol 600-200 MG-UNIT tablet Take  by mouth.     • citalopram (CeleXA) 20 MG tablet TAKE 1 TABLET EVERY DAY 90 tablet 3   • clonazePAM (KLONOPIN) 0.5 MG tablet 1 po bid prn 30 tablet 0   • doxepin (SINEquan) 10 MG capsule Take 1 capsule by mouth Every Night. 30 capsule 0   • omeprazole (priLOSEC) 20 MG capsule TAKE 1 CAPSULE EVERY DAY 90 capsule 3     No facility-administered medications prior to visit.        Patient Active Problem List   Diagnosis   • Carpal tunnel syndrome   • Mixed anxiety depressive disorder   • Gastroesophageal reflux disease   • Hyperlipidemia   • Chronic insomnia   •  "Menopause present   • Osteopenia   • Raynaud's phenomenon   • Screening for diabetes mellitus   • Fatigue   • Arthritis   • Vitamin B deficiency   • Chronic bilateral low back pain without sciatica       Advanced Care Planning:  ACP discussion was held with the patient during this visit. Patient has an advance directive (not in EMR), copy requested.    Review of Systems   Musculoskeletal: Positive for back pain.   All other systems reviewed and are negative.      Compared to one year ago, the patient feels her physical health is the same.  Compared to one year ago, the patient feels her mental health is the same.    Reviewed chart for potential of high risk medication in the elderly: yes  Reviewed chart for potential of harmful drug interactions in the elderly:yes    Objective         Vitals:    12/08/20 1021   BP: 142/70   Pulse: 92   Temp: 97.1 °F (36.2 °C)   SpO2: 95%   Weight: 62.8 kg (138 lb 6.4 oz)   Height: 162.6 cm (64.02\")   PainSc: 0-No pain       Body mass index is 23.74 kg/m².  Discussed the patient's BMI with her. The BMI is in the acceptable range.    Physical Exam  Vitals signs reviewed.   Constitutional:       General: She is not in acute distress.     Appearance: She is well-developed.   HENT:      Head: Normocephalic.   Cardiovascular:      Rate and Rhythm: Normal rate and regular rhythm.      Heart sounds: Normal heart sounds.   Pulmonary:      Effort: Pulmonary effort is normal.      Breath sounds: Normal breath sounds.   Neurological:      Mental Status: She is alert and oriented to person, place, and time.      Gait: Gait normal.   Psychiatric:         Behavior: Behavior normal.         Thought Content: Thought content normal.         Judgment: Judgment normal.               Assessment/Plan   Medicare Risks and Personalized Health Plan  CMS Preventative Services Quick Reference  Cardiovascular risk  Diabetic Lab Screening   Fall Risk    The above risks/problems have been discussed with the " patient.  Pertinent information has been shared with the patient in the After Visit Summary.  Follow up plans and orders are seen below in the Assessment/Plan Section.    Diagnoses and all orders for this visit:    1. Medicare annual wellness visit, initial (Primary)    2. Hyperlipidemia, unspecified hyperlipidemia type  -     Comprehensive Metabolic Panel  -     Lipid Panel With LDL / HDL Ratio    3. Screening for iron deficiency anemia  -     CBC & Differential    4. Screening for diabetes mellitus  -     Comprehensive Metabolic Panel    5. Vitamin B deficiency  -     Vitamin B12 & Folate    6. Chronic bilateral low back pain without sciatica  -     Ambulatory Referral to Physical Therapy Evaluate and treat      Follow Up:  No follow-ups on file.     An After Visit Summary and PPPS were given to the patient.

## 2020-12-09 ENCOUNTER — TELEPHONE (OUTPATIENT)
Dept: FAMILY MEDICINE CLINIC | Facility: CLINIC | Age: 70
End: 2020-12-09

## 2020-12-09 DIAGNOSIS — E78.5 HYPERLIPIDEMIA, UNSPECIFIED HYPERLIPIDEMIA TYPE: Primary | ICD-10-CM

## 2020-12-09 LAB
ALBUMIN SERPL-MCNC: 4.3 G/DL (ref 3.5–5.2)
ALBUMIN/GLOB SERPL: 1.8 G/DL
ALP SERPL-CCNC: 59 U/L (ref 39–117)
ALT SERPL-CCNC: 12 U/L (ref 1–33)
AST SERPL-CCNC: 20 U/L (ref 1–32)
BASOPHILS # BLD AUTO: 0.04 10*3/MM3 (ref 0–0.2)
BASOPHILS NFR BLD AUTO: 1 % (ref 0–1.5)
BILIRUB SERPL-MCNC: 0.3 MG/DL (ref 0–1.2)
BUN SERPL-MCNC: 14 MG/DL (ref 8–23)
BUN/CREAT SERPL: 18.9 (ref 7–25)
CALCIUM SERPL-MCNC: 9.3 MG/DL (ref 8.6–10.5)
CHLORIDE SERPL-SCNC: 103 MMOL/L (ref 98–107)
CHOLEST SERPL-MCNC: 292 MG/DL (ref 0–200)
CO2 SERPL-SCNC: 29 MMOL/L (ref 22–29)
CREAT SERPL-MCNC: 0.74 MG/DL (ref 0.57–1)
EOSINOPHIL # BLD AUTO: 0.16 10*3/MM3 (ref 0–0.4)
EOSINOPHIL NFR BLD AUTO: 4.2 % (ref 0.3–6.2)
ERYTHROCYTE [DISTWIDTH] IN BLOOD BY AUTOMATED COUNT: 12.3 % (ref 12.3–15.4)
FOLATE SERPL-MCNC: 7.8 NG/ML (ref 4.78–24.2)
GLOBULIN SER CALC-MCNC: 2.4 GM/DL
GLUCOSE SERPL-MCNC: 65 MG/DL (ref 65–99)
HCT VFR BLD AUTO: 41.5 % (ref 34–46.6)
HDLC SERPL-MCNC: 99 MG/DL (ref 40–60)
HGB BLD-MCNC: 14 G/DL (ref 12–15.9)
IMM GRANULOCYTES # BLD AUTO: 0.01 10*3/MM3 (ref 0–0.05)
IMM GRANULOCYTES NFR BLD AUTO: 0.3 % (ref 0–0.5)
LDLC SERPL CALC-MCNC: 171 MG/DL (ref 0–100)
LDLC/HDLC SERPL: 1.69 {RATIO}
LYMPHOCYTES # BLD AUTO: 1.35 10*3/MM3 (ref 0.7–3.1)
LYMPHOCYTES NFR BLD AUTO: 35.3 % (ref 19.6–45.3)
MCH RBC QN AUTO: 32.6 PG (ref 26.6–33)
MCHC RBC AUTO-ENTMCNC: 33.7 G/DL (ref 31.5–35.7)
MCV RBC AUTO: 96.7 FL (ref 79–97)
MONOCYTES # BLD AUTO: 0.39 10*3/MM3 (ref 0.1–0.9)
MONOCYTES NFR BLD AUTO: 10.2 % (ref 5–12)
NEUTROPHILS # BLD AUTO: 1.87 10*3/MM3 (ref 1.7–7)
NEUTROPHILS NFR BLD AUTO: 49 % (ref 42.7–76)
NRBC BLD AUTO-RTO: 0 /100 WBC (ref 0–0.2)
PLATELET # BLD AUTO: 236 10*3/MM3 (ref 140–450)
POTASSIUM SERPL-SCNC: 4.9 MMOL/L (ref 3.5–5.2)
PROT SERPL-MCNC: 6.7 G/DL (ref 6–8.5)
RBC # BLD AUTO: 4.29 10*6/MM3 (ref 3.77–5.28)
SODIUM SERPL-SCNC: 142 MMOL/L (ref 136–145)
TRIGL SERPL-MCNC: 130 MG/DL (ref 0–150)
VIT B12 SERPL-MCNC: 289 PG/ML (ref 211–946)
VLDLC SERPL CALC-MCNC: 22 MG/DL (ref 5–40)
WBC # BLD AUTO: 3.82 10*3/MM3 (ref 3.4–10.8)

## 2020-12-09 RX ORDER — ROSUVASTATIN CALCIUM 20 MG/1
20 TABLET, COATED ORAL DAILY
Qty: 90 TABLET | Refills: 3 | Status: SHIPPED | OUTPATIENT
Start: 2020-12-09 | End: 2021-07-15 | Stop reason: SDUPTHER

## 2020-12-09 NOTE — TELEPHONE ENCOUNTER
Patient is aware of her lab results and cholesterol reading. Mailing copy to patient's residence. Patient would like the new Crestor Rx to be sent to [facility] please.

## 2021-01-04 ENCOUNTER — TREATMENT (OUTPATIENT)
Dept: PHYSICAL THERAPY | Facility: CLINIC | Age: 71
End: 2021-01-04

## 2021-01-04 DIAGNOSIS — M79.604 PAIN IN BOTH LOWER EXTREMITIES: Primary | ICD-10-CM

## 2021-01-04 DIAGNOSIS — M79.605 PAIN IN BOTH LOWER EXTREMITIES: Primary | ICD-10-CM

## 2021-01-04 PROCEDURE — 97110 THERAPEUTIC EXERCISES: CPT | Performed by: PHYSICAL THERAPIST

## 2021-01-04 PROCEDURE — 97161 PT EVAL LOW COMPLEX 20 MIN: CPT | Performed by: PHYSICAL THERAPIST

## 2021-01-04 NOTE — PROGRESS NOTES
Physical Therapy Initial Evaluation and Plan of Care      Patient: Marianne Carrera   : 1950  Diagnosis/ICD-10 Code:  Pain in both lower extremities [M79.604, M79.605]  Referring practitioner: KETTY Agustin  Date of Initial Visit: 2021  Today's Date: 2021  Patient seen for 1 sessions           Subjective Evaluation    History of Present Illness  Mechanism of injury: History of legs hurting at night waking her up for 2 years.  Pain may be anywhere in her legs.  It usually wakes her up at 6:30 with a throb. It has not worsened. She does not have pain during the day. She sleeps on her side and has started to use a pillow between her legs. She feels like she has a good mattress.     Pain  Current pain ratin  At best pain ratin  At worst pain ratin  Location: Legs   Quality: throbbing  Progression: no change    Social Support  Lives with: spouse             Objective          Static Posture     Lumbar Spine   Decreased lordosis.     Comments  Right shoulder lower/lean to right     Palpation   Left   Hypertonic in the lateral gastrocnemius and medial gastrocnemius.     Right   Hypertonic in the lateral gastrocnemius and medial gastrocnemius.     Active Range of Motion     Lumbar   Flexion: Active lumbar flexion: 2/3    Extension: Active lumbar extension: 2/3.   Left lateral flexion: Active left lumbar lateral flexion: 2/3.   Right lateral flexion: Active right lumbar lateral flexion: 2/3.   Left rotation: Active left lumbar rotation: 2/3.   Right rotation: Active right lumbar rotation: 2/3.     Additional Active Range of Motion Details  Lumbar stiffness in all planes   Flexibility:  Moderate tightness B piriformis (hip into IR), hamstrings, calf, quads     Strength/Myotome Testing     Left Hip   Planes of Motion   Flexion: 5  Extension: 3  Abduction: 4+    Right Hip   Planes of Motion   Flexion: 5  Extension: 3  Abduction: 4-    Left Knee   Flexion: 5  Extension: 5    Right Knee   Flexion:  5  Extension: 5    Left Ankle/Foot   Dorsiflexion: 5    Right Ankle/Foot   Dorsiflexion: 5    Additional Strength Details  Transverse abdominals 3/5  Able to bridge hips 3/5    Tests     Lumbar     Left   Negative femoral stretch and passive SLR.     Right   Negative femoral stretch and passive SLR.     Ambulation   Weight-Bearing Status   Weight-Bearing Status (Left): weight-bearing as tolerated   Weight-Bearing Status (Right): weight-bearing as tolerated    Assistive device used: none        See Exercise, Manual, and Modality Logs for complete treatment.   Issued HEP  Recommend better shoe wear  Discussed sleep postures    Functional outcome score:Modified Oswestry score is 29% disability score            Assessment & Plan     Assessment  Impairments: abnormal muscle tone, abnormal or restricted ROM, impaired physical strength and lacks appropriate home exercise program  Assessment details: Marianne Carrera is a 70 y.o. year-old female referred to physical therapy for leg pain at night. She presents with a stable clinical presentation because she does not have pain during the day.  She has no known comorbidities or personal factors that may affect her progress in the plan of care.  Signs and symptoms are consistent with physical therapy diagnosis of bilateral leg pain that may be due to core weakness and inflexibility.   Prognosis: good  Functional Limitations: sleeping and uncomfortable because of pain  Goals  Plan Goals: STGs to be met by 02/15/2021    STG1 Patient will demonstrate an independent HEP for core and leg strength and ROM/flexibility.    STG2 Patient will be independent with good postures and shoe wear to decrease strain to legs     STG3 Patient will report decreased leg pain at night by 25%    LTGs to be achieved by 04/02/2021:    LTG 1 Patient will self manage her symptoms with HEP and education     LTG 2 Patient will report decreased leg pain at night by 50%    LTG3 Patient will report decreased  functional disability on Modified Oswestry score from 29 % to 19 %    Plan  Therapy options: will be seen for skilled physical therapy services  Other planned modality interventions: Aquatic therapy  Planned therapy interventions: abdominal trunk stabilization, ADL retraining, neuromuscular re-education, manual therapy, balance/weight-bearing training, body mechanics training, home exercise program, therapeutic activities, stretching, strengthening and soft tissue mobilization  Duration in visits: 20  Duration in weeks: 12  Treatment plan discussed with: patient        Timed:  Manual Therapy:    0     mins  03015;  Therapeutic Exercise:    10     mins  93346;     Neuromuscular Bandar:    0    mins  87954;    Therapeutic Activity:     5     mins  42816;     Gait Trainin     mins  64438;     Ultrasound:     0     mins  60783;    Electrical Stimulation:    0     mins  87782 ( );  Iontophoresis    0     mins 35213  Dry Needling   0     mins      Untimed:  Electrical Stimulation:    00     mins  98948 ( );  Mechanical Traction:    0     mins  57605;     Timed Treatment:   15   mins   Total Treatment:     40   mins    PT SIGNATURE: Janet Page, PT   DATE TREATMENT INITIATED: 2021    Medicare Initial Certification  Certification Period: 2021  I certify that the therapy services are furnished while this patient is under my care.  The services outlined above are required by this patient, and will be reviewed every 90 days.     PHYSICIAN: Shan Wilkes APRN      DATE:     Please sign and return via fax to [phone] Thank you, [facility].

## 2021-03-19 ENCOUNTER — BULK ORDERING (OUTPATIENT)
Dept: CASE MANAGEMENT | Facility: OTHER | Age: 71
End: 2021-03-19

## 2021-03-19 DIAGNOSIS — Z23 IMMUNIZATION DUE: ICD-10-CM

## 2021-04-05 ENCOUNTER — TELEPHONE (OUTPATIENT)
Dept: FAMILY MEDICINE CLINIC | Facility: CLINIC | Age: 71
End: 2021-04-05

## 2021-04-05 NOTE — TELEPHONE ENCOUNTER
Marianne stopped at checkout to ask if Shan could recommend a Dermatologist for both her and , Benjamin Carrera? Marianne would prefer a female physician and Benjamin a male. Their current Dermatologist Orlando Henriquez MD, recently retired. Please advise.

## 2021-04-07 DIAGNOSIS — K21.9 GASTROESOPHAGEAL REFLUX DISEASE WITHOUT ESOPHAGITIS: ICD-10-CM

## 2021-04-08 RX ORDER — OMEPRAZOLE 20 MG/1
CAPSULE, DELAYED RELEASE ORAL
Qty: 90 CAPSULE | Refills: 3 | Status: SHIPPED | OUTPATIENT
Start: 2021-04-08 | End: 2022-02-02 | Stop reason: SDUPTHER

## 2021-04-28 ENCOUNTER — TELEPHONE (OUTPATIENT)
Dept: FAMILY MEDICINE CLINIC | Facility: CLINIC | Age: 71
End: 2021-04-28

## 2021-04-28 DIAGNOSIS — Z78.0 POST-MENOPAUSAL: Primary | ICD-10-CM

## 2021-04-28 NOTE — TELEPHONE ENCOUNTER
Caller: Marianne Carrera    Relationship: Self    Best call back number: [phone]  What is the medical concern/diagnosis: YEARLY BONE DENSITY TEST    What specialty or service is being requested: BONE DENSITY TEST    What is the provider, practice or medical service name: [facility]    What is the office location: [address] [place] [address]    What is the office phone number: [phone]  FAX NUMBER: [phone]    Any additional details: PATIENT WOULD LIKE A REFERRAL FOR A BONE DENSITY TEST. PATIENT STATED IT IS TIME FOR HER YEARLY TEST AND WOULD LIKE THIS ASAP.

## 2021-05-19 ENCOUNTER — APPOINTMENT (OUTPATIENT)
Dept: WOMENS IMAGING | Facility: HOSPITAL | Age: 71
End: 2021-05-19

## 2021-05-19 PROCEDURE — 77080 DXA BONE DENSITY AXIAL: CPT | Performed by: RADIOLOGY

## 2021-06-03 ENCOUNTER — APPOINTMENT (OUTPATIENT)
Dept: WOMENS IMAGING | Facility: HOSPITAL | Age: 71
End: 2021-06-03

## 2021-06-03 PROCEDURE — 77063 BREAST TOMOSYNTHESIS BI: CPT | Performed by: RADIOLOGY

## 2021-06-03 PROCEDURE — 77067 SCR MAMMO BI INCL CAD: CPT | Performed by: RADIOLOGY

## 2021-06-04 DIAGNOSIS — F41.8 MIXED ANXIETY DEPRESSIVE DISORDER: ICD-10-CM

## 2021-06-04 RX ORDER — CITALOPRAM 20 MG/1
TABLET ORAL
Qty: 90 TABLET | Refills: 3 | Status: SHIPPED | OUTPATIENT
Start: 2021-06-04 | End: 2022-05-23 | Stop reason: SDUPTHER

## 2021-06-08 ENCOUNTER — OFFICE VISIT (OUTPATIENT)
Dept: FAMILY MEDICINE CLINIC | Facility: CLINIC | Age: 71
End: 2021-06-08

## 2021-06-08 VITALS
TEMPERATURE: 97.8 F | BODY MASS INDEX: 22.98 KG/M2 | HEIGHT: 64 IN | HEART RATE: 84 BPM | OXYGEN SATURATION: 99 % | WEIGHT: 134.6 LBS | DIASTOLIC BLOOD PRESSURE: 84 MMHG | SYSTOLIC BLOOD PRESSURE: 120 MMHG

## 2021-06-08 DIAGNOSIS — E78.5 HYPERLIPIDEMIA, UNSPECIFIED HYPERLIPIDEMIA TYPE: Primary | ICD-10-CM

## 2021-06-08 DIAGNOSIS — M19.90 ARTHRITIS: ICD-10-CM

## 2021-06-08 DIAGNOSIS — Z85.828 HISTORY OF BASAL CELL CANCER: ICD-10-CM

## 2021-06-08 DIAGNOSIS — F51.04 CHRONIC INSOMNIA: ICD-10-CM

## 2021-06-08 DIAGNOSIS — K21.9 GASTROESOPHAGEAL REFLUX DISEASE WITHOUT ESOPHAGITIS: ICD-10-CM

## 2021-06-08 DIAGNOSIS — F41.8 MIXED ANXIETY DEPRESSIVE DISORDER: ICD-10-CM

## 2021-06-08 PROCEDURE — 99214 OFFICE O/P EST MOD 30 MIN: CPT | Performed by: NURSE PRACTITIONER

## 2021-06-08 RX ORDER — MELOXICAM 15 MG/1
15 TABLET ORAL DAILY
Qty: 30 TABLET | Refills: 3 | Status: SHIPPED | OUTPATIENT
Start: 2021-06-08 | End: 2021-08-03 | Stop reason: SDUPTHER

## 2021-06-08 NOTE — PROGRESS NOTES
"Chief Complaint  Hyperlipidemia (Patient is here for her f/u she is fasting to have her labs drawn ( wore mask and goggles) ), Insomnia (Patient is wanting to discuss getting a prescription for ambien ), Basal Cell Carcinoma (Patient is needing a new dermatologist hers is retiring would llike to go to one near [place] or [facility] if possible ), and Leg Pain (Patient is still having bilateral leg pain she tried physical therapy but that didnt help )    Subjective          Marianne Carrera presents to [facility]  History of Present Illness  1. Hyperlipidemia-patient is currently on Crestor 20 mg daily as directed without any side effects.  Her cholesterol was last checked in December 2020 with a total cholesterol of 292 and an LDL of 171.  She is fasting today to have these levels rechecked    2. Insomina-patient tried doxepin 10 mg did not work for insomnia.  Patient states that she will go to bed at midnight get up at 4 in the next night she might not go to bed till 2 or 3 and then get up at 10 or 11.    3. Anxiety-patient's anxiety is well controlled with Celexa 20 mg daily as directed without any side effects    4. Hx of Basal Diamond Carcinoma-patient has a history of basal cell carcinoma and her dermatologist is retiring she is requesting referral to a new dermatologist somewhere located close to [place]    5. Leg pain-patient still having bilateral hip pain and leg pain she went to physical therapy for this and it did not help.  She states she does not have any numbness or tingling in the legs she does not have any back pain.  Physical therapy focus mainly on back.  She has not tried any anti-inflammatories.    #6 acid reflux-patient is currently on Prilosec 20 mg daily as directed with any side effects working well to control her acid reflux symptoms.    Objective   Vital Signs:   /84   Pulse 84   Temp 97.8 °F (36.6 °C)   Ht 162.6 cm (64.02\")   Wt 61.1 kg (134 lb 9.6 oz)   " SpO2 99%   BMI 23.09 kg/m²     Physical Exam  Vitals reviewed.   Constitutional:       General: She is not in acute distress.     Appearance: She is well-developed.   HENT:      Head: Normocephalic.   Cardiovascular:      Rate and Rhythm: Normal rate and regular rhythm.      Heart sounds: Normal heart sounds.   Pulmonary:      Effort: Pulmonary effort is normal.      Breath sounds: Normal breath sounds.   Neurological:      Mental Status: She is alert and oriented to person, place, and time.      Gait: Gait normal.   Psychiatric:         Behavior: Behavior normal.         Thought Content: Thought content normal.         Judgment: Judgment normal.        Result Review :   The following data was reviewed by: KETTY Agustin on 06/08/2021:  CMP    CMP 12/8/20   Glucose 65   BUN 14   Creatinine 0.74   eGFR Non  Am 78   eGFR  Am 94   Sodium 142   Potassium 4.9   Chloride 103   Calcium 9.3   Total Protein 6.7   Albumin 4.30   Globulin 2.4   Total Bilirubin 0.3   Alkaline Phosphatase 59   AST (SGOT) 20   ALT (SGPT) 12      Comments are available for some flowsheets but are not being displayed.           CBC w/diff    CBC w/Diff 12/8/20   WBC 3.82   RBC 4.29   Hemoglobin 14.0   Hematocrit 41.5   MCV 96.7   MCH 32.6   MCHC 33.7   RDW 12.3   Platelets 236   Neutrophil Rel % 49.0   Lymphocyte Rel % 35.3   Monocyte Rel % 10.2   Eosinophil Rel % 4.2   Basophil Rel % 1.0           Lipid Panel    Lipid Panel 12/8/20   Total Cholesterol 292 (A)   Triglycerides 130   HDL Cholesterol 99 (A)   VLDL Cholesterol 22   LDL Cholesterol  171 (A)   LDL/HDL Ratio 1.69   (A) Abnormal value                      Assessment and Plan    Diagnoses and all orders for this visit:    1. Hyperlipidemia, unspecified hyperlipidemia type (Primary)  -     Lipid Panel With LDL / HDL Ratio    2. Chronic insomnia    3. Gastroesophageal reflux disease without esophagitis    4. Mixed anxiety depressive disorder    5. History of basal cell  cancer  -     Ambulatory Referral to Dermatology    6. Arthritis  -     meloxicam (Mobic) 15 MG tablet; Take 1 tablet by mouth Daily.  Dispense: 30 tablet; Refill: 3        Follow Up   No follow-ups on file.  Patient was given instructions and counseling regarding her condition or for health maintenance advice. Please see specific information pulled into the AVS if appropriate.     #1 continue with medication follow-up after lab.    2.  We discussed resetting her sleep cycle because she seems to not be on a good regimen.  So we discussed her going to bed at midnight getting up at 4 and not napping during the day doing this for 2 weeks and then for the next 2 weeks go to bed at midnight and get up at 5 to see if we can reset her sleep.  Patient is in agreement to this versus the medication.    3.  Continue same with Celexa.    4.  Referral placed for new dermatology.    5.  Start Mobic 15 mg daily     #6 continue same with medication    Mask and googles worn

## 2021-06-09 LAB
CHOLEST SERPL-MCNC: 199 MG/DL (ref 0–200)
HDLC SERPL-MCNC: 98 MG/DL (ref 40–60)
LDLC SERPL CALC-MCNC: 87 MG/DL (ref 0–100)
LDLC/HDLC SERPL: 0.87 {RATIO}
TRIGL SERPL-MCNC: 81 MG/DL (ref 0–150)
VLDLC SERPL CALC-MCNC: 14 MG/DL (ref 5–40)

## 2021-07-15 DIAGNOSIS — E78.5 HYPERLIPIDEMIA, UNSPECIFIED HYPERLIPIDEMIA TYPE: ICD-10-CM

## 2021-07-15 RX ORDER — ROSUVASTATIN CALCIUM 20 MG/1
20 TABLET, COATED ORAL DAILY
Qty: 90 TABLET | Refills: 3 | Status: SHIPPED | OUTPATIENT
Start: 2021-07-15 | End: 2022-02-02 | Stop reason: SDUPTHER

## 2021-08-03 DIAGNOSIS — M19.90 ARTHRITIS: ICD-10-CM

## 2021-08-03 RX ORDER — MELOXICAM 15 MG/1
15 TABLET ORAL DAILY
Qty: 90 TABLET | Refills: 3 | Status: SHIPPED | OUTPATIENT
Start: 2021-08-03 | End: 2022-05-23

## 2021-12-08 ENCOUNTER — TELEPHONE (OUTPATIENT)
Dept: FAMILY MEDICINE CLINIC | Facility: CLINIC | Age: 71
End: 2021-12-08

## 2021-12-08 ENCOUNTER — TELEMEDICINE (OUTPATIENT)
Dept: FAMILY MEDICINE CLINIC | Facility: CLINIC | Age: 71
End: 2021-12-08

## 2021-12-08 VITALS — TEMPERATURE: 97 F | OXYGEN SATURATION: 96 %

## 2021-12-08 DIAGNOSIS — R05.9 COUGH: Primary | ICD-10-CM

## 2021-12-08 DIAGNOSIS — J01.00 ACUTE NON-RECURRENT MAXILLARY SINUSITIS: ICD-10-CM

## 2021-12-08 LAB
EXPIRATION DATE: NORMAL
FLUAV AG NPH QL: NEGATIVE
FLUBV AG NPH QL: NEGATIVE
INTERNAL CONTROL: NORMAL
Lab: NORMAL

## 2021-12-08 PROCEDURE — 87804 INFLUENZA ASSAY W/OPTIC: CPT

## 2021-12-08 PROCEDURE — 99213 OFFICE O/P EST LOW 20 MIN: CPT

## 2021-12-08 RX ORDER — CEFDINIR 300 MG/1
300 CAPSULE ORAL 2 TIMES DAILY
Qty: 20 CAPSULE | Refills: 0 | Status: SHIPPED | OUTPATIENT
Start: 2021-12-08 | End: 2021-12-18

## 2021-12-08 NOTE — PROGRESS NOTES
Chief Complaint  Sinus Problem (Coughing, lighthead,drainage started 11/22 had a rapid covid test it was neg, it has got worst, no fever, chills)    Subjective          Marianne Carrera presents to [facility]  History of Present Illness     This was an audio and video enabled telemedicine encounter.  Patient consented to telemedicine.  Complete through video chat.  Patient present at her home and myself present at [address] [place] [address].      Patient presents to this visit with symptoms of cough, sinus drainage and sinus pressure, and feeling lightheaded for the past 3 weeks.  Patient said her symptoms started on November 22 and on the 29th she did a home Covid test which is negative.  She said over the past 3 weeks her symptoms have waxed and waned and some days felt better than other days feel like it is getting worse.  Today she does feel like her symptoms are getting worse and her drainage has gone from being clear to yellow.  Patient is not coughing up anything at this point and denies any shortness of breath, chest pain, or wheezing.  Patient denies any sore throat or ear pain.  Patient says she does feel like she may be losing a little bit of taste but her smell is intact.  Patient has taken over-the-counter Mucinex and Sudafed for her symptoms.  Patient has been vaccinated for Covid and has no known exposure.  Patient says she has not had a fever and that her temperature today is 97 and O2 sat is 96%.      Objective   Vital Signs: No other vitals-telemedicine  Temp 97 °F (36.1 °C)   SpO2 96%     Physical Exam  Constitutional:       General: She is not in acute distress.  Pulmonary:      Effort: Pulmonary effort is normal. No respiratory distress.   Neurological:      Mental Status: She is alert.   Psychiatric:         Mood and Affect: Mood normal.        Result Review :   The following data was reviewed by: KETTY Reynolds on 12/08/2021:  Influenza A&B    Common Labsle  12/8/21   Rapid Influenza A Ag Negative   Rapid Influenza B Ag Negative                     Assessment and Plan    Diagnoses and all orders for this visit:    1. Cough (Primary)  -     COVID-19,[facility] ROUTINE, NP/OP SWAB IN TRANSPORT MEDIA OR ESWAB 72 HR TAT - Swab, Nasopharynx; Future  -     POCT Influenza A/B  -     COVID-19,[facility] ROUTINE, NP/OP SWAB IN TRANSPORT MEDIA OR ESWAB 72 HR TAT - Swab, Nasopharynx    2. Acute non-recurrent maxillary sinusitis  -     cefdinir (OMNICEF) 300 MG capsule; Take 1 capsule by mouth 2 (Two) Times a Day for 10 days.  Dispense: 20 capsule; Refill: 0      Pleasant 71-year-old female presents this telemedicine visit with complaints of upper respiratory infection symptoms of cough, lightheadedness, sinus pressure and congestion.  Although patient did have a home negative Covid test repeating Covid test for PCR.  Flu test negative today.  Since this has been an ongoing issue for the past 3 weeks with some extensive sinus pressure and congestion sending in cefdinir for acute sinusitis.  Following up with patient with a Covid test results.  Counseled to quarantine at this time until we have those results back.  If experiencing any shortness of breath or chest pain or high fever go to ER.  Otherwise follow-up if symptoms do not improve or worsen.      Follow Up   Return if symptoms worsen or fail to improve.  Patient was given instructions and counseling regarding her condition or for health maintenance advice. Please see specific information pulled into the AVS if appropriate.     Medical assistant and I wore mask and eyewear protection during entire encounter.  Patient wore mask.      Electronically signed by KETTY Reynolds, 12/08/21, 3:33 PM EST.

## 2021-12-08 NOTE — TELEPHONE ENCOUNTER
Shan is off today and no other provider here is going to prescribe her something without her being seen if no appts are available with someone here than she will need to go to [facility]

## 2021-12-08 NOTE — TELEPHONE ENCOUNTER
Pt feels like she is have a sinus infection, says nose is running with cloudy color, yellowish.  Coughing.      Took a Rapid home Covid test, came back neg.     Pt requesting something to get rid of the sinus infection.     Last seen in office 6/8/21    Please advise     Pharmacy Fax [phone]

## 2021-12-09 LAB
LABCORP SARS-COV-2, NAA 2 DAY TAT: NORMAL
SARS-COV-2 RNA RESP QL NAA+PROBE: NOT DETECTED

## 2022-02-02 DIAGNOSIS — E78.5 HYPERLIPIDEMIA, UNSPECIFIED HYPERLIPIDEMIA TYPE: ICD-10-CM

## 2022-02-02 DIAGNOSIS — K21.9 GASTROESOPHAGEAL REFLUX DISEASE WITHOUT ESOPHAGITIS: ICD-10-CM

## 2022-02-02 NOTE — TELEPHONE ENCOUNTER
Rx Refill Note  Requested Prescriptions     Pending Prescriptions Disp Refills   • omeprazole (priLOSEC) 20 MG capsule 90 capsule 1     Sig: Take 1 capsule by mouth Daily.   • rosuvastatin (Crestor) 20 MG tablet 90 tablet 1     Sig: Take 1 tablet by mouth Daily.      Last office visit with prescribing clinician: 6/8/2021      Next office visit with prescribing clinician: Visit date not found            Carina Crews MA  02/02/22, 08:32 EST

## 2022-02-03 RX ORDER — ROSUVASTATIN CALCIUM 20 MG/1
20 TABLET, COATED ORAL DAILY
Qty: 90 TABLET | Refills: 0 | Status: SHIPPED | OUTPATIENT
Start: 2022-02-03 | End: 2022-05-16

## 2022-02-03 RX ORDER — OMEPRAZOLE 20 MG/1
20 CAPSULE, DELAYED RELEASE ORAL DAILY
Qty: 90 CAPSULE | Refills: 0 | Status: SHIPPED | OUTPATIENT
Start: 2022-02-03 | End: 2022-02-04 | Stop reason: SDUPTHER

## 2022-02-04 DIAGNOSIS — K21.9 GASTROESOPHAGEAL REFLUX DISEASE WITHOUT ESOPHAGITIS: ICD-10-CM

## 2022-02-04 RX ORDER — OMEPRAZOLE 20 MG/1
20 CAPSULE, DELAYED RELEASE ORAL DAILY
Qty: 30 CAPSULE | Refills: 0 | Status: SHIPPED | OUTPATIENT
Start: 2022-02-04 | End: 2022-05-16

## 2022-02-04 NOTE — TELEPHONE ENCOUNTER
----- Message from Marianne Carrera sent at 2/3/2022  2:38 PM EST -----  Regarding: Omeprazole  Pls send a 30 day prescription to [facility] [address] I have been out since Jan 12 and [facility] said they were waiting on getting the prescription approved by Dr chris.   I think someone at [facility] has dropped the ball. Thanks.

## 2022-02-08 NOTE — TELEPHONE ENCOUNTER
[facility] pharmacy has been faxing over refill request.Talked to patient she no longer needs Cefdinir 300mg capsule.

## 2022-04-17 DIAGNOSIS — E78.5 HYPERLIPIDEMIA, UNSPECIFIED HYPERLIPIDEMIA TYPE: ICD-10-CM

## 2022-04-18 RX ORDER — ROSUVASTATIN CALCIUM 20 MG/1
TABLET, COATED ORAL
Qty: 90 TABLET | Refills: 0 | OUTPATIENT
Start: 2022-04-18

## 2022-05-16 DIAGNOSIS — K21.9 GASTROESOPHAGEAL REFLUX DISEASE WITHOUT ESOPHAGITIS: ICD-10-CM

## 2022-05-16 DIAGNOSIS — E78.5 HYPERLIPIDEMIA, UNSPECIFIED HYPERLIPIDEMIA TYPE: ICD-10-CM

## 2022-05-16 RX ORDER — ROSUVASTATIN CALCIUM 20 MG/1
TABLET, COATED ORAL
Qty: 90 TABLET | Refills: 1 | Status: SHIPPED | OUTPATIENT
Start: 2022-05-16 | End: 2022-10-19 | Stop reason: SDUPTHER

## 2022-05-16 RX ORDER — OMEPRAZOLE 20 MG/1
CAPSULE, DELAYED RELEASE ORAL
Qty: 90 CAPSULE | Refills: 1 | Status: SHIPPED | OUTPATIENT
Start: 2022-05-16 | End: 2023-02-13

## 2022-05-23 ENCOUNTER — OFFICE VISIT (OUTPATIENT)
Dept: FAMILY MEDICINE CLINIC | Facility: CLINIC | Age: 72
End: 2022-05-23

## 2022-05-23 VITALS
DIASTOLIC BLOOD PRESSURE: 80 MMHG | OXYGEN SATURATION: 98 % | SYSTOLIC BLOOD PRESSURE: 130 MMHG | TEMPERATURE: 97.6 F | WEIGHT: 138 LBS | HEIGHT: 64 IN | BODY MASS INDEX: 23.56 KG/M2 | HEART RATE: 65 BPM

## 2022-05-23 DIAGNOSIS — Z13.1 SCREENING FOR DIABETES MELLITUS: ICD-10-CM

## 2022-05-23 DIAGNOSIS — F41.8 MIXED ANXIETY DEPRESSIVE DISORDER: ICD-10-CM

## 2022-05-23 DIAGNOSIS — E78.5 HYPERLIPIDEMIA, UNSPECIFIED HYPERLIPIDEMIA TYPE: Primary | ICD-10-CM

## 2022-05-23 DIAGNOSIS — Z13.0 SCREENING FOR IRON DEFICIENCY ANEMIA: ICD-10-CM

## 2022-05-23 DIAGNOSIS — K21.9 GASTROESOPHAGEAL REFLUX DISEASE WITHOUT ESOPHAGITIS: ICD-10-CM

## 2022-05-23 PROCEDURE — 99214 OFFICE O/P EST MOD 30 MIN: CPT | Performed by: NURSE PRACTITIONER

## 2022-05-23 RX ORDER — CITALOPRAM 20 MG/1
20 TABLET ORAL DAILY
Qty: 90 TABLET | Refills: 3 | Status: SHIPPED | OUTPATIENT
Start: 2022-05-23

## 2022-05-23 NOTE — PROGRESS NOTES
"Chief Complaint  Heartburn (Refill on med), Hyperlipidemia (Refill on med and level check, pt fasting for labs ), and Anxiety (Refill on med )    Subjective          Marianne Carrera presents to [facility]  History of Present Illness  GERD-patient is currently on omeprazole 20 mg daily as directed any side effects.  Working well to control acid reflux symptoms.    Hyperlipidemia-patient is currently on Crestor 20 mg daily as directed any side effects.  Cholesterol was last checked June 2021 with a total cholesterol of 199 and an LDL of 87    Anxiety-patient is currently on Celexa 20 mg daily as directed any side effects.  Working well to control anxiety.      Objective   Vital Signs:  /80   Pulse 65   Temp 97.6 °F (36.4 °C)   Ht 162.6 cm (64\")   Wt 62.6 kg (138 lb)   SpO2 98%   BMI 23.69 kg/m²   BMI has not been calculated during today's encounter.       Physical Exam  Vitals reviewed.   Constitutional:       General: She is not in acute distress.     Appearance: She is well-developed.   HENT:      Head: Normocephalic.   Cardiovascular:      Rate and Rhythm: Normal rate and regular rhythm.      Heart sounds: Normal heart sounds.   Pulmonary:      Effort: Pulmonary effort is normal.      Breath sounds: Normal breath sounds.   Neurological:      Mental Status: She is alert and oriented to person, place, and time.      Gait: Gait normal.   Psychiatric:         Behavior: Behavior normal.         Thought Content: Thought content normal.         Judgment: Judgment normal.        Result Review :   The following data was reviewed by: KETTY Agustin on 05/23/2022:        Lipid Panel    Lipid Panel 6/8/21   Total Cholesterol 199   Triglycerides 81   HDL Cholesterol 98 (A)   VLDL Cholesterol 14   LDL Cholesterol  87   LDL/HDL Ratio 0.87   (A) Abnormal value       Comments are available for some flowsheets but are not being displayed.                             Assessment and Plan  "   Diagnoses and all orders for this visit:    1. Hyperlipidemia, unspecified hyperlipidemia type (Primary)  -     Comprehensive Metabolic Panel  -     Lipid Panel With LDL / HDL Ratio    2. Gastroesophageal reflux disease without esophagitis    3. Mixed anxiety depressive disorder  -     citalopram (CeleXA) 20 MG tablet; Take 1 tablet by mouth Daily.  Dispense: 90 tablet; Refill: 3    4. Screening for iron deficiency anemia  -     CBC & Differential    5. Screening for diabetes mellitus  -     Comprehensive Metabolic Panel             Follow Up   Return in about 6 months (around 11/23/2022) for Recheck.  Patient was given instructions and counseling regarding her condition or for health maintenance advice. Please see specific information pulled into the AVS if appropriate.     Cont same with meds  Follow up after labs     Mask and googles worn

## 2022-05-24 LAB
ALBUMIN SERPL-MCNC: 4.5 G/DL (ref 3.8–4.8)
ALBUMIN/GLOB SERPL: 1.8 {RATIO} (ref 1.2–2.2)
ALP SERPL-CCNC: 69 IU/L (ref 44–121)
ALT SERPL-CCNC: 17 IU/L (ref 0–32)
AST SERPL-CCNC: 27 IU/L (ref 0–40)
BASOPHILS # BLD AUTO: 0.1 X10E3/UL (ref 0–0.2)
BASOPHILS NFR BLD AUTO: 1 %
BILIRUB SERPL-MCNC: 0.4 MG/DL (ref 0–1.2)
BUN SERPL-MCNC: 14 MG/DL (ref 8–27)
BUN/CREAT SERPL: 18 (ref 12–28)
CALCIUM SERPL-MCNC: 9.4 MG/DL (ref 8.7–10.3)
CHLORIDE SERPL-SCNC: 102 MMOL/L (ref 96–106)
CHOLEST SERPL-MCNC: 214 MG/DL (ref 100–199)
CO2 SERPL-SCNC: 26 MMOL/L (ref 20–29)
CREAT SERPL-MCNC: 0.79 MG/DL (ref 0.57–1)
EGFRCR SERPLBLD CKD-EPI 2021: 80 ML/MIN/1.73
EOSINOPHIL # BLD AUTO: 0.1 X10E3/UL (ref 0–0.4)
EOSINOPHIL NFR BLD AUTO: 4 %
ERYTHROCYTE [DISTWIDTH] IN BLOOD BY AUTOMATED COUNT: 12.6 % (ref 11.7–15.4)
GLOBULIN SER CALC-MCNC: 2.5 G/DL (ref 1.5–4.5)
GLUCOSE SERPL-MCNC: 76 MG/DL (ref 65–99)
HCT VFR BLD AUTO: 42.3 % (ref 34–46.6)
HDLC SERPL-MCNC: 86 MG/DL
HGB BLD-MCNC: 14.3 G/DL (ref 11.1–15.9)
IMM GRANULOCYTES # BLD AUTO: 0 X10E3/UL (ref 0–0.1)
IMM GRANULOCYTES NFR BLD AUTO: 0 %
LDLC SERPL CALC-MCNC: 114 MG/DL (ref 0–99)
LDLC/HDLC SERPL: 1.3 RATIO (ref 0–3.2)
LYMPHOCYTES # BLD AUTO: 1.3 X10E3/UL (ref 0.7–3.1)
LYMPHOCYTES NFR BLD AUTO: 32 %
MCH RBC QN AUTO: 33.3 PG (ref 26.6–33)
MCHC RBC AUTO-ENTMCNC: 33.8 G/DL (ref 31.5–35.7)
MCV RBC AUTO: 98 FL (ref 79–97)
MONOCYTES # BLD AUTO: 0.4 X10E3/UL (ref 0.1–0.9)
MONOCYTES NFR BLD AUTO: 11 %
NEUTROPHILS # BLD AUTO: 2.1 X10E3/UL (ref 1.4–7)
NEUTROPHILS NFR BLD AUTO: 52 %
PLATELET # BLD AUTO: 251 X10E3/UL (ref 150–450)
POTASSIUM SERPL-SCNC: 4.8 MMOL/L (ref 3.5–5.2)
PROT SERPL-MCNC: 7 G/DL (ref 6–8.5)
RBC # BLD AUTO: 4.3 X10E6/UL (ref 3.77–5.28)
SODIUM SERPL-SCNC: 141 MMOL/L (ref 134–144)
TRIGL SERPL-MCNC: 81 MG/DL (ref 0–149)
VLDLC SERPL CALC-MCNC: 14 MG/DL (ref 5–40)
WBC # BLD AUTO: 4 X10E3/UL (ref 3.4–10.8)

## 2022-06-09 ENCOUNTER — APPOINTMENT (OUTPATIENT)
Dept: WOMENS IMAGING | Facility: HOSPITAL | Age: 72
End: 2022-06-09

## 2022-06-09 PROCEDURE — 77063 BREAST TOMOSYNTHESIS BI: CPT | Performed by: RADIOLOGY

## 2022-06-09 PROCEDURE — 77067 SCR MAMMO BI INCL CAD: CPT | Performed by: RADIOLOGY

## 2022-08-11 ENCOUNTER — OFFICE VISIT (OUTPATIENT)
Dept: INTERNAL MEDICINE | Facility: CLINIC | Age: 72
End: 2022-08-11

## 2022-08-11 VITALS
TEMPERATURE: 98.4 F | DIASTOLIC BLOOD PRESSURE: 72 MMHG | OXYGEN SATURATION: 98 % | BODY MASS INDEX: 23.9 KG/M2 | SYSTOLIC BLOOD PRESSURE: 126 MMHG | HEIGHT: 64 IN | WEIGHT: 140 LBS | HEART RATE: 78 BPM

## 2022-08-11 DIAGNOSIS — H69.81 EUSTACHIAN TUBE DYSFUNCTION, RIGHT: ICD-10-CM

## 2022-08-11 DIAGNOSIS — Z78.0 POST-MENOPAUSE: ICD-10-CM

## 2022-08-11 DIAGNOSIS — Z13.820 SCREENING FOR OSTEOPOROSIS: Primary | ICD-10-CM

## 2022-08-11 PROCEDURE — 99213 OFFICE O/P EST LOW 20 MIN: CPT | Performed by: STUDENT IN AN ORGANIZED HEALTH CARE EDUCATION/TRAINING PROGRAM

## 2022-08-11 RX ORDER — GUAIFENESIN 600 MG/1
600 TABLET, EXTENDED RELEASE ORAL 2 TIMES DAILY
Qty: 30 TABLET | Refills: 0 | Status: SHIPPED | OUTPATIENT
Start: 2022-08-11 | End: 2022-08-12 | Stop reason: SDUPTHER

## 2022-08-11 RX ORDER — FLUTICASONE PROPIONATE 50 MCG
2 SPRAY, SUSPENSION (ML) NASAL DAILY
Qty: 16 G | Refills: 2 | Status: SHIPPED | OUTPATIENT
Start: 2022-08-11 | End: 2022-08-12 | Stop reason: SDUPTHER

## 2022-08-11 NOTE — PROGRESS NOTES
"Chief Complaint  Establish Care (New Patient )    Subjective        Marianne Carrera is a 71-year-old with past medical history significant for hyperlipidemia, GERD, anxiety/depression who presents to [facility] to establish care.  History of Present Illness    Left ear pressure  Pressure for 6 weeks in left year since getting COVID  No sinus drainage. No sinus pressure. No ear drainage. No pain      2017 colonoscopy  hysterectomy    Had covid in June     Got boosted 2 weeks ago.     ASA for primary prevention    Takes celexa. Took Klonipin years ago but no longer.    Enjoys gardening. Growing tomatoes and butternut squash. [place]  and her like to get out and go for lunch and shopping. She likes to walk but it is too hot out currently.    She has osteopenia. Diagnosed 2019.     Has had hystorectomy in 2018? For bleeding. Follows with GYN.     Objective   Vital Signs:  /72 (BP Location: Right arm, Patient Position: Sitting, Cuff Size: Adult)   Pulse 78   Temp 98.4 °F (36.9 °C) (Oral)   Ht 162.6 cm (64.02\")   Wt 63.5 kg (140 lb)   SpO2 98%   BMI 24.02 kg/m²   Estimated body mass index is 24.02 kg/m² as calculated from the following:    Height as of this encounter: 162.6 cm (64.02\").    Weight as of this encounter: 63.5 kg (140 lb).    BMI is within normal parameters. No other follow-up for BMI required.      Physical Exam  Constitutional:       Appearance: Normal appearance. She is normal weight.   HENT:      Right Ear: Tympanic membrane, ear canal and external ear normal. Decreased hearing noted.      Left Ear: Ear canal and external ear normal. Decreased hearing noted. A middle ear effusion is present.      Mouth/Throat:      Mouth: Mucous membranes are moist.      Pharynx: Oropharynx is clear. No posterior oropharyngeal erythema.      Comments: Great dentition  Cardiovascular:      Rate and Rhythm: Normal rate and regular rhythm.      Heart sounds: " Normal heart sounds. No murmur heard.  Pulmonary:      Effort: Pulmonary effort is normal.      Breath sounds: Normal breath sounds.   Abdominal:      General: Abdomen is flat. There is no distension.      Palpations: Abdomen is soft.      Tenderness: There is no abdominal tenderness.   Skin:     General: Skin is warm and dry.   Neurological:      Mental Status: She is alert.   Psychiatric:         Mood and Affect: Mood normal.         Behavior: Behavior normal.         Thought Content: Thought content normal.        Result Review :    CMP    CMP 5/23/22   Glucose 76   BUN 14   Creatinine 0.79   Sodium 141   Potassium 4.8   Chloride 102   Calcium 9.4   Total Protein 7.0   Albumin 4.5   Globulin 2.5   Total Bilirubin 0.4   Alkaline Phosphatase 69   AST (SGOT) 27   ALT (SGPT) 17           CBC    CBC 5/23/22   WBC 4.0   RBC 4.30   Hemoglobin 14.3   Hematocrit 42.3   MCV 98 (A)   MCH 33.3 (A)   MCHC 33.8   RDW 12.6   Platelets 251   (A) Abnormal value            Lipid Panel    Lipid Panel 5/23/22   Total Cholesterol 214 (A)   Triglycerides 81   HDL Cholesterol 86   VLDL Cholesterol 14   LDL Cholesterol  114 (A)   LDL/HDL Ratio 1.3   (A) Abnormal value       Comments are available for some flowsheets but are not being displayed.                   Data reviewed: previous PCP notes          Assessment and Plan   Diagnoses and all orders for this visit:    1. Screening for osteoporosis (Primary)  -     DEXA Bone Density Axial    2. Post-menopause  -     DEXA Bone Density Axial    Other orders  -     fluticasone (Flonase) 50 MCG/ACT nasal spray; 2 sprays into the nostril(s) as directed by provider Daily for 60 days.  Dispense: 16 g; Refill: 2  -     guaiFENesin (Mucinex) 600 MG 12 hr tablet; Take 1 tablet by mouth 2 (Two) Times a Day for 30 days. For congestion  Dispense: 30 tablet; Refill: 0    Eustachian tube dysfunction- suspect ear effusion is secondary to recent viral infection. We will treat with nasal  steroid/decongestant.     Dexa for osteoporisis screeing    Return for AMW visit in 3 months. We will order labs then         Follow Up   Return in about 3 months (around 11/11/2022) for Medicare Wellness.  Patient was given instructions and counseling regarding her condition or for health maintenance advice. Please see specific information pulled into the AVS if appropriate.

## 2022-08-12 RX ORDER — FLUTICASONE PROPIONATE 50 MCG
2 SPRAY, SUSPENSION (ML) NASAL DAILY
Qty: 16 G | Refills: 2 | Status: SHIPPED | OUTPATIENT
Start: 2022-08-12 | End: 2022-11-14

## 2022-08-12 RX ORDER — GUAIFENESIN 600 MG/1
600 TABLET, EXTENDED RELEASE ORAL 2 TIMES DAILY
Qty: 30 TABLET | Refills: 0 | Status: SHIPPED | OUTPATIENT
Start: 2022-08-12 | End: 2022-09-11

## 2022-08-12 NOTE — TELEPHONE ENCOUNTER
Caller: Marianne Carrera VINNY    Relationship: Self    Best call back number: [phone]    Requested Prescriptions:   Requested Prescriptions     Pending Prescriptions Disp Refills   • guaiFENesin (Mucinex) 600 MG 12 hr tablet 30 tablet 0     Sig: Take 1 tablet by mouth 2 (Two) Times a Day for 30 days. For congestion   • fluticasone (Flonase) 50 MCG/ACT nasal spray 16 g 2     Si sprays into the nostril(s) as directed by provider Daily for 60 days.        Pharmacy where request should be sent: [address] ([facility] [address] [facility] [phone] [facility] [phone] FX     Additional details provided by patient: MEDICATIONS WERE SENT TO [facility] YESTERDAY, PLEASE RESEND TO LOCAL PHARMACY.     Does the patient have less than a 3 day supply:  [x] Yes  [] No    Marilee Padilla Rep   22 11:34 EDT

## 2022-10-19 DIAGNOSIS — E78.5 HYPERLIPIDEMIA, UNSPECIFIED HYPERLIPIDEMIA TYPE: ICD-10-CM

## 2022-10-19 RX ORDER — ROSUVASTATIN CALCIUM 20 MG/1
20 TABLET, COATED ORAL DAILY
Qty: 90 TABLET | Refills: 1 | Status: SHIPPED | OUTPATIENT
Start: 2022-10-19

## 2022-11-14 ENCOUNTER — OFFICE VISIT (OUTPATIENT)
Dept: INTERNAL MEDICINE | Age: 72
End: 2022-11-14

## 2022-11-14 VITALS
DIASTOLIC BLOOD PRESSURE: 70 MMHG | SYSTOLIC BLOOD PRESSURE: 120 MMHG | WEIGHT: 140 LBS | HEART RATE: 64 BPM | BODY MASS INDEX: 23.9 KG/M2 | TEMPERATURE: 96.8 F | HEIGHT: 64 IN

## 2022-11-14 DIAGNOSIS — E78.5 HYPERLIPIDEMIA, UNSPECIFIED HYPERLIPIDEMIA TYPE: ICD-10-CM

## 2022-11-14 DIAGNOSIS — Z23 FLU VACCINE NEED: ICD-10-CM

## 2022-11-14 DIAGNOSIS — F41.8 MIXED ANXIETY DEPRESSIVE DISORDER: ICD-10-CM

## 2022-11-14 DIAGNOSIS — Z76.89 ENCOUNTER TO ESTABLISH CARE: Primary | ICD-10-CM

## 2022-11-14 PROCEDURE — 90662 IIV NO PRSV INCREASED AG IM: CPT

## 2022-11-14 PROCEDURE — 99214 OFFICE O/P EST MOD 30 MIN: CPT

## 2022-11-14 PROCEDURE — G0008 ADMIN INFLUENZA VIRUS VAC: HCPCS

## 2022-11-14 NOTE — PROGRESS NOTES
"    I N T E R N A L  M E D I C I N E  Hazel Estrada, APRN    ENCOUNTER DATE:  11/14/2022    Mariannetam Carrera / 71 y.o. / female      CHIEF COMPLAINT / REASON FOR OFFICE VISIT     Establish Care and Hyperlipidemia      ASSESSMENT & PLAN     Diagnoses and all orders for this visit:    1. Encounter to establish care (Primary)    2. Hyperlipidemia, unspecified hyperlipidemia type  Overview:  Continue rosuvastatin 20 mg daily.      3. Mixed anxiety depressive disorder  Overview:  Continue citalopram 20 mg daily.      4. Flu vaccine need  -     Fluzone High-Dose 65+yrs       SUMMARY/DISCUSSION  • Discussion had with pt about risks/ benefits of remaining on aspirin 81 mg daily.  She would like to continue daily usage at this time.  • Agreeable to update AWV/ labs in 1 month or so.      Next Appointment with me: Visit date not found    Return in about 1 month (around 12/14/2022) for Medicare Wellness.      VITAL SIGNS     Visit Vitals  /70   Pulse 64   Temp 96.8 °F (36 °C)   Ht 162.6 cm (64.02\")   Wt 63.5 kg (140 lb)   BMI 24.02 kg/m²             Wt Readings from Last 3 Encounters:   11/14/22 63.5 kg (140 lb)   08/11/22 63.5 kg (140 lb)   05/23/22 62.6 kg (138 lb)     Body mass index is 24.02 kg/m².        MEDICATIONS AT THE TIME OF OFFICE VISIT     Current Outpatient Medications on File Prior to Visit   Medication Sig Dispense Refill   • aspirin 81 MG tablet Take  by mouth.     • Calcium Carb-Cholecalciferol 600-200 MG-UNIT tablet Take  by mouth.     • citalopram (CeleXA) 20 MG tablet Take 1 tablet by mouth Daily. 90 tablet 3   • omeprazole (priLOSEC) 20 MG capsule TAKE 1 CAPSULE EVERY DAY 90 capsule 1   • rosuvastatin (CRESTOR) 20 MG tablet Take 1 tablet by mouth Daily. 90 tablet 1   • [DISCONTINUED] fluticasone (Flonase) 50 MCG/ACT nasal spray 2 sprays into the nostril(s) as directed by provider Daily for 60 days. 16 g 2     No current facility-administered medications on file prior to visit.        HISTORY OF PRESENT " ILLNESS     Anxiety/ Depression: Citalopram 20 mg daily with benefit.  No SI/ HI.    HLD: Remains on Rosuvastatin 20 mg daily.  LDL of May 2022 was 114.    Takes Aspirin 81 mg daily.  No personal hx of stroke/ CAD.  Family hx significant for stroke in dad.    GERD: Omeprazole 20 mg daily.  No early satiety/ dysphagia.      Up to date on colonoscopy.  Next due in 2027.    DEXA scheduled in January 2023.  Mammogram updated by OBGYN, Dr. Zhang.      Will look into whether she has received second dose of Shingles vaccine.      Followed by Margarito Roy, dermatology, once a year.  Personal hx of basal cell CA.      Patient Care Team:  Hazel Estrada APRN as PCP - General (Family Medicine)  Barbara Zhang MD (Inactive) as Consulting Physician (Gynecology)    REVIEW OF SYSTEMS     Review of Systems   Constitutional: Negative for chills, fever and unexpected weight change.   Respiratory: Negative for cough, chest tightness and shortness of breath.    Cardiovascular: Negative for chest pain, palpitations and leg swelling.   Neurological: Negative for dizziness, weakness, light-headedness and headaches.   Psychiatric/Behavioral: The patient is not nervous/anxious.           PHYSICAL EXAMINATION     Physical Exam  Vitals reviewed.   Constitutional:       General: She is not in acute distress.     Appearance: Normal appearance. She is not ill-appearing, toxic-appearing or diaphoretic.   HENT:      Head: Normocephalic and atraumatic.   Cardiovascular:      Rate and Rhythm: Normal rate and regular rhythm.      Heart sounds: Normal heart sounds.   Pulmonary:      Effort: Pulmonary effort is normal.      Breath sounds: Normal breath sounds.   Musculoskeletal:      Right lower leg: No edema.      Left lower leg: No edema.   Neurological:      Mental Status: She is alert and oriented to person, place, and time. Mental status is at baseline.   Psychiatric:         Mood and Affect: Mood normal.         Behavior: Behavior normal.          Thought Content: Thought content normal.         Judgment: Judgment normal.           REVIEWED DATA     Labs:           Imaging:            Medical Tests:           Summary of old records / correspondence / consultant report:           Request outside records:

## 2022-12-28 ENCOUNTER — TELEPHONE (OUTPATIENT)
Dept: INTERNAL MEDICINE | Age: 72
End: 2022-12-28

## 2022-12-28 NOTE — TELEPHONE ENCOUNTER
Caller: Marianne Carrera    Relationship to patient: Self    Best call back number: [phone]    Patient is needing: THE PATIENT RESCHEDULED HER WELLNESS VISIT WITH BROCK FERNANDEZ FOR 12/30/2022.

## 2022-12-30 ENCOUNTER — OFFICE VISIT (OUTPATIENT)
Dept: INTERNAL MEDICINE | Age: 72
End: 2022-12-30

## 2022-12-30 VITALS
DIASTOLIC BLOOD PRESSURE: 86 MMHG | SYSTOLIC BLOOD PRESSURE: 132 MMHG | OXYGEN SATURATION: 100 % | BODY MASS INDEX: 23.8 KG/M2 | HEART RATE: 63 BPM | TEMPERATURE: 98 F | WEIGHT: 139.4 LBS | HEIGHT: 64 IN

## 2022-12-30 DIAGNOSIS — G89.29 CHRONIC BILATERAL LOW BACK PAIN WITHOUT SCIATICA: ICD-10-CM

## 2022-12-30 DIAGNOSIS — M54.50 CHRONIC BILATERAL LOW BACK PAIN WITHOUT SCIATICA: ICD-10-CM

## 2022-12-30 DIAGNOSIS — F41.8 MIXED ANXIETY DEPRESSIVE DISORDER: ICD-10-CM

## 2022-12-30 DIAGNOSIS — K21.9 GASTROESOPHAGEAL REFLUX DISEASE WITHOUT ESOPHAGITIS: ICD-10-CM

## 2022-12-30 DIAGNOSIS — E53.9 VITAMIN B DEFICIENCY: ICD-10-CM

## 2022-12-30 DIAGNOSIS — Z13.0 SCREENING FOR IRON DEFICIENCY ANEMIA: ICD-10-CM

## 2022-12-30 DIAGNOSIS — E78.5 HYPERLIPIDEMIA, UNSPECIFIED HYPERLIPIDEMIA TYPE: Primary | ICD-10-CM

## 2022-12-30 DIAGNOSIS — M81.0 AGE-RELATED OSTEOPOROSIS WITHOUT CURRENT PATHOLOGICAL FRACTURE: ICD-10-CM

## 2022-12-30 DIAGNOSIS — F51.04 CHRONIC INSOMNIA: ICD-10-CM

## 2022-12-30 PROCEDURE — 1170F FXNL STATUS ASSESSED: CPT | Performed by: NURSE PRACTITIONER

## 2022-12-30 PROCEDURE — G0439 PPPS, SUBSEQ VISIT: HCPCS | Performed by: NURSE PRACTITIONER

## 2022-12-30 PROCEDURE — 1159F MED LIST DOCD IN RCRD: CPT | Performed by: NURSE PRACTITIONER

## 2022-12-30 NOTE — PROGRESS NOTES
I N T E R N A L  M E D I C I N E  Vikki KETTY Mike      ENCOUNTER DATE:  12/30/2022    Marianne Carrera / 72 y.o. / female        MEDICARE ANNUAL WELLNESS VISIT       Chief Complaint: Medicare Wellness-subsequent       Patient's general assessment of her health since a year ago:     - Compared to one year ago, she feels her physical health is about the same without significant change.    - Compared to one year ago, she feels her mental health is about the same without significant change.      HPI for other active medical problems:     Patient states her anxiety is currently controlled with citalopram 20 mg daily.  Patient states continues to exercise and monitor her weight, and continues on rosuvastatin 20 mg daily.  Most recent .  Patient states continues with arthritic pain and takes Tylenol as needed.  Patient voices no concerns today.        HISTORY       Recent Hospitalizations:    Recent hospitalization?: None    If YES, location, date, and diagnoses:     · Location:   · Date:   · Principle Discharge Dx:   · Secondary Dx:       Patient Care Team:    Patient Care Team:  Hazel Estrada APRN as PCP - General (Family Medicine)  Barbara Zhang MD (Inactive) as Consulting Physician (Gynecology)      Allergies:  Codeine    Medications:  Current Outpatient Medications on File Prior to Visit   Medication Sig Dispense Refill   • aspirin 81 MG tablet Take  by mouth.     • Calcium Carb-Cholecalciferol 600-200 MG-UNIT tablet Take  by mouth.     • citalopram (CeleXA) 20 MG tablet Take 1 tablet by mouth Daily. 90 tablet 3   • omeprazole (priLOSEC) 20 MG capsule TAKE 1 CAPSULE EVERY DAY 90 capsule 1   • rosuvastatin (CRESTOR) 20 MG tablet Take 1 tablet by mouth Daily. 90 tablet 1     No current facility-administered medications on file prior to visit.        PFSH:     The following portions of the patient's history were reviewed and updated as appropriate: Allergies / Current Medications / Past Medical History /  Surgical History / Social History / Family History    Problem List:  Patient Active Problem List   Diagnosis   • Carpal tunnel syndrome   • Mixed anxiety depressive disorder   • Gastroesophageal reflux disease   • Hyperlipidemia   • Chronic insomnia   • Menopause present   • Osteopenia   • Raynaud's phenomenon   • Screening for iron deficiency anemia   • Fatigue   • Arthritis   • Vitamin B deficiency   • Chronic bilateral low back pain without sciatica   • History of basal cell cancer       Past Medical History:  Past Medical History:   Diagnosis Date   • Anxiety    • Cataract    • Depression    • GERD (gastroesophageal reflux disease)    • Hormone imbalance    • Hyperlipidemia    • Osteopenia        Past Surgical History:  Past Surgical History:   Procedure Laterality Date   • COLONOSCOPY  2017   • HAND SURGERY     • HYSTERECTOMY         Social History:  Social History     Socioeconomic History   • Marital status:    Tobacco Use   • Smoking status: Former     Packs/day: 1.00     Years: 15.00     Pack years: 15.00     Types: Cigarettes     Quit date: 1992     Years since quittin.0   • Smokeless tobacco: Former   Vaping Use   • Vaping Use: Never used   Substance and Sexual Activity   • Alcohol use: Yes     Comment: 4-5 glasses of wine a wk., depending on what we are doing   • Drug use: Never   • Sexual activity: Defer       Family History:  Family History   Problem Relation Age of Onset   • Hypertension Mother    • Arthritis Mother    • Heart attack Father    • Arthritis Father    • Hypertension Father    • Stroke Father    • Hypertension Sister    • Hyperlipidemia Sister    • Cancer Maternal Uncle         colon cancer         PATIENT ASSESSMENT     Vitals:  Vitals:    22 1028   BP: 132/86   BP Location: Right arm   Patient Position: Sitting   Cuff Size: Adult   Pulse: 63   Temp: 98 °F (36.7 °C)   TempSrc: Temporal   SpO2: 100%   Weight: 63.2 kg (139 lb 6.4 oz)   Height:  "162.6 cm (64.02\")       BP Readings from Last 3 Encounters:   12/30/22 132/86   11/14/22 120/70   08/11/22 126/72     Wt Readings from Last 3 Encounters:   12/30/22 63.2 kg (139 lb 6.4 oz)   11/14/22 63.5 kg (140 lb)   08/11/22 63.5 kg (140 lb)      Body mass index is 23.91 kg/m².    Blood pressure readings recorded on patient flowsheet:  No flowsheet data found.         Review of Systems:    Review of Systems   Constitutional: Negative.    HENT: Negative.    Eyes: Negative.         Wears glasses   Respiratory: Negative.    Cardiovascular: Negative.  Negative for chest pain and palpitations.   Gastrointestinal: Negative.    Genitourinary: Negative.    Musculoskeletal: Positive for arthralgias.   Skin: Negative.    Neurological: Negative.  Negative for dizziness and headaches.   Psychiatric/Behavioral: Negative.        Physical Exam:    Physical Exam  Constitutional:       Appearance: Normal appearance. She is normal weight.   HENT:      Head: Normocephalic.      Right Ear: Tympanic membrane and ear canal normal.      Left Ear: Tympanic membrane and ear canal normal.      Nose: Nose normal.      Mouth/Throat:      Mouth: Mucous membranes are moist.      Pharynx: Oropharynx is clear.   Eyes:      Extraocular Movements: Extraocular movements intact.      Conjunctiva/sclera: Conjunctivae normal.   Cardiovascular:      Rate and Rhythm: Normal rate and regular rhythm.      Pulses: Normal pulses.      Heart sounds: Normal heart sounds.   Pulmonary:      Effort: Pulmonary effort is normal.      Breath sounds: Normal breath sounds.   Abdominal:      General: Bowel sounds are normal.      Palpations: Abdomen is soft.   Musculoskeletal:         General: Normal range of motion.      Cervical back: Normal range of motion and neck supple.   Skin:     General: Skin is warm and dry.      Capillary Refill: Capillary refill takes less than 2 seconds.   Neurological:      Mental Status: She is alert and oriented to person, place, and " time.         Reviewed Data:    Labs:   Lab Results   Component Value Date     05/23/2022    K 4.8 05/23/2022    CALCIUM 9.4 05/23/2022    AST 27 05/23/2022    ALT 17 05/23/2022    BUN 14 05/23/2022    CREATININE 0.79 05/23/2022    CREATININE 0.74 12/08/2020    CREATININE 0.86 12/12/2019    EGFRIFNONA 78 12/08/2020    EGFRIFAFRI 94 12/08/2020       Lab Results   Component Value Date    HGBA1C 5.10 04/13/2016       Lab Results   Component Value Date     (H) 05/23/2022    LDL 87 06/08/2021     (H) 12/08/2020    HDL 86 05/23/2022    TRIG 81 05/23/2022       Lab Results   Component Value Date    TSH 1.550 12/06/2018          Lab Results   Component Value Date    WBC 4.0 05/23/2022    HGB 14.3 05/23/2022    HGB 14.0 12/08/2020    HGB 14.0 12/12/2019     05/23/2022                 No results found for: PSA    Imaging:          Medical Tests:          Screening for Glaucoma:  Previous screening for glaucoma?: Yes      Hearing Loss Screen:  Finger Rub Hearing Test (right ear): passed  Finger Rub Hearing Test (left ear): passed      Urinary Incontinence Screen:  Episodes of urinary incontinence? : No      Depression Screen:  PHQ-2/PHQ-9 Depression Screening 12/30/2022   Retired Total Score -   Little Interest or Pleasure in Doing Things 0-->not at all   Feeling Down, Depressed or Hopeless 0-->not at all   PHQ-9: Brief Depression Severity Measure Score 0        PHQ-2: 0 (Not depressed)    PHQ-9: 0 (Negative screening for depression)       FUNCTIONAL, FALL RISK, & COGNITIVE SCREENING (Components below):    DATA:    Functional & Cognitive Status 12/30/2022   Do you have difficulty preparing food and eating? No   Do you have difficulty bathing yourself, getting dressed or grooming yourself? No   Do you have difficulty using the toilet? No   Do you have difficulty moving around from place to place? No   Do you have trouble with steps or getting out of a bed or a chair? No   Current Diet Well Balanced  Diet   Dental Exam Up to date   Eye Exam Up to date   Exercise (times per week) 2 times per week   Current Exercises Include Walking   Current Exercise Activities Include -   Do you need help using the phone?  No   Are you deaf or do you have serious difficulty hearing?  No   Do you need help with transportation? No   Do you need help shopping? No   Do you need help preparing meals?  No   Do you need help with housework?  No   Do you need help with laundry? No   Do you need help taking your medications? No   Do you need help managing money? No   Do you ever drive or ride in a car without wearing a seat belt? No   Have you felt unusual stress, anger or loneliness in the last month? No   Who do you live with? Spouse   If you need help, do you have trouble finding someone available to you? No   Have you been bothered in the last four weeks by sexual problems? No   Do you have difficulty concentrating, remembering or making decisions? No         A) Assessment of Functional Ability:  (Assessment of ability to perform ADL's (showering/bathing, using toilet, dressing, feeding self, moving self around) and IADL's (use telephone, shop, prepare food, housekeep, do laundry, transport independently, take medications independently, and handle finances)    Degree of functional impairment: NONE (based on assessment noted above)      B) Assessment of Fall Risk:  Fall Risk Assessment was completed, and patient is at low risk for falls.       Need for further evaluation of gait, strength, and balance? : No    Timed Up and Go (TUG):   (>= 12 seconds indicates high risk for falling)    Observable abnormalities included: Normal gait pattern       C. Assessment of Cognitive Function:    Mini-Cog Test:     1) Registration (3 objects): Yes   2) Number of objects recalled: 3   3) Clock Draw: Passed? : Yes       Further evaluation required? : No        COUNSELING       A. Identification of Health Risk Factors:    Risk factors include:  chronic pain and depression / other psychiatric problems      B. Age-Appropriate Screening Schedule:  (Refer to the list below for future screening recommendations based on patient's age, sex and/or medical conditions. Orders for these recommended tests are listed in the plan section. The patient has been provided with a written plan)    Health Maintenance Topics  Health Maintenance   Topic Date Due   • ZOSTER VACCINE (2 of 2) 01/10/2016   • DXA SCAN  05/19/2023   • LIPID PANEL  05/23/2023   • PAP SMEAR  12/01/2023   • MAMMOGRAM  06/09/2024   • TDAP/TD VACCINES (4 - Td or Tdap) 09/13/2026   • INFLUENZA VACCINE  Completed       Health Maintenance Topics Due or Over-Due  Health Maintenance Due   Topic Date Due   • ZOSTER VACCINE (2 of 2) 01/10/2016   • HEPATITIS C SCREENING  Never done   • COVID-19 Vaccine (5 - Booster for Moderna series) 09/29/2022         C. Advanced Care Planning:    Advance Care Planning   ACP discussion was held with the patient during this visit. Patient has an advance directive in EMR which is still valid.        D. Patient Self-Management and Personalized Health Advice:    She has been provided with personalized counseling/information (including brochures/handouts) about:     -- optimizing diet/nutrition plans, improving exercise / conditioning, reducing risk for cardiac/vascular events with pre-existing disease, managing depression/anxiety and management of chronic pain with focus on minimizing use of narcotic pain medications      She has been recommended for the following preventative services which has been performed today, will be ordered today or ordered/performed on upcoming follow-up visit:     -- NUTRITION counseling provided, EXERCISE counseling provided, vaccination for SHINGRIX administered  (or recommended)      E. Miscellaneous Items:    -Aspirin use counseling: Taking ASA appropriately as indicated    -Discussed BMI with her. The BMI is in the acceptable range    -Reviewed use  of high risk medication in the elderly: YES    -Reviewed for potential of harmful drug interactions in the elderly: YES        WRAP UP       Assessment & Plan:    1) SUBSEQUENT MEDICARE ANNUAL WELLNESS VISIT    2) OTHER MEDICAL CONDITIONS ADDRESSED TODAY:            Problem List Items Addressed This Visit        Cardiac and Vasculature    Hyperlipidemia - Primary    Overview     Continue rosuvastatin 20 mg daily.         Relevant Medications    rosuvastatin (CRESTOR) 20 MG tablet    Other Relevant Orders    CBC w AUTO Differential    Comprehensive metabolic panel    Lipid panel    Hepatitis C antibody       Endocrine and Metabolic    Vitamin B deficiency    Relevant Orders    Vitamin D 25 hydroxy       Gastrointestinal Abdominal     Gastroesophageal reflux disease    Relevant Medications    omeprazole (priLOSEC) 20 MG capsule       Hematology and Neoplasia    Screening for iron deficiency anemia    Relevant Orders    Vitamin D 25 hydroxy       Mental Health    Mixed anxiety depressive disorder    Overview     Continue citalopram 20 mg daily.         Relevant Medications    citalopram (CeleXA) 20 MG tablet       Musculoskeletal and Injuries    Chronic bilateral low back pain without sciatica       Sleep    Chronic insomnia   Other Visit Diagnoses     Age-related osteoporosis without current pathological fracture        Relevant Orders    Vitamin D 25 hydroxy                    Orders Placed This Encounter   Procedures   • Comprehensive metabolic panel   • Vitamin D 25 hydroxy   • Lipid panel   • Hepatitis C antibody   • CBC w AUTO Differential       Discussion / Summary:  Follow up with KETTY Azevedo  as scheduled and as needed      Medications as of TODAY:              Current Outpatient Medications   Medication Sig Dispense Refill   • aspirin 81 MG tablet Take  by mouth.     • Calcium Carb-Cholecalciferol 600-200 MG-UNIT tablet Take  by mouth.     • citalopram (CeleXA) 20 MG tablet Take 1 tablet by mouth  Daily. 90 tablet 3   • omeprazole (priLOSEC) 20 MG capsule TAKE 1 CAPSULE EVERY DAY 90 capsule 1   • rosuvastatin (CRESTOR) 20 MG tablet Take 1 tablet by mouth Daily. 90 tablet 1     No current facility-administered medications for this visit.         FOLLOW-UP:            Return in about 6 months (around 6/30/2023) for Next scheduled follow up.                 Future Appointments   Date Time Provider Department Center   2/16/2023  1:00 PM [facility]  [facility]           After Visit Summary (AVS) including the Personalized Prevention  Plan Services (PPPS) was either printed and given to the patient at check-out today and/or sent to [facility] for review.       KETTY Marroquin

## 2022-12-31 LAB
25(OH)D3+25(OH)D2 SERPL-MCNC: 57.3 NG/ML (ref 30–100)
ALBUMIN SERPL-MCNC: 4.5 G/DL (ref 3.5–5.2)
ALBUMIN/GLOB SERPL: 2 G/DL
ALP SERPL-CCNC: 68 U/L (ref 39–117)
ALT SERPL-CCNC: 15 U/L (ref 1–33)
AST SERPL-CCNC: 26 U/L (ref 1–32)
BASOPHILS # BLD AUTO: 0.04 10*3/MM3 (ref 0–0.2)
BASOPHILS NFR BLD AUTO: 1 % (ref 0–1.5)
BILIRUB SERPL-MCNC: 0.4 MG/DL (ref 0–1.2)
BUN SERPL-MCNC: 15 MG/DL (ref 8–23)
BUN/CREAT SERPL: 19.5 (ref 7–25)
CALCIUM SERPL-MCNC: 9.1 MG/DL (ref 8.6–10.5)
CHLORIDE SERPL-SCNC: 103 MMOL/L (ref 98–107)
CHOLEST SERPL-MCNC: 231 MG/DL (ref 0–200)
CO2 SERPL-SCNC: 29.2 MMOL/L (ref 22–29)
CREAT SERPL-MCNC: 0.77 MG/DL (ref 0.57–1)
EGFRCR SERPLBLD CKD-EPI 2021: 82.6 ML/MIN/1.73
EOSINOPHIL # BLD AUTO: 0.12 10*3/MM3 (ref 0–0.4)
EOSINOPHIL NFR BLD AUTO: 2.9 % (ref 0.3–6.2)
ERYTHROCYTE [DISTWIDTH] IN BLOOD BY AUTOMATED COUNT: 12.3 % (ref 12.3–15.4)
GLOBULIN SER CALC-MCNC: 2.3 GM/DL
GLUCOSE SERPL-MCNC: 77 MG/DL (ref 65–99)
HCT VFR BLD AUTO: 41.6 % (ref 34–46.6)
HCV AB S/CO SERPL IA: <0.1 S/CO RATIO (ref 0–0.9)
HDLC SERPL-MCNC: 97 MG/DL (ref 40–60)
HGB BLD-MCNC: 14 G/DL (ref 12–15.9)
IMM GRANULOCYTES # BLD AUTO: 0 10*3/MM3 (ref 0–0.05)
IMM GRANULOCYTES NFR BLD AUTO: 0 % (ref 0–0.5)
LDLC SERPL CALC-MCNC: 118 MG/DL (ref 0–100)
LYMPHOCYTES # BLD AUTO: 1.43 10*3/MM3 (ref 0.7–3.1)
LYMPHOCYTES NFR BLD AUTO: 34.6 % (ref 19.6–45.3)
MCH RBC QN AUTO: 33.2 PG (ref 26.6–33)
MCHC RBC AUTO-ENTMCNC: 33.7 G/DL (ref 31.5–35.7)
MCV RBC AUTO: 98.6 FL (ref 79–97)
MONOCYTES # BLD AUTO: 0.38 10*3/MM3 (ref 0.1–0.9)
MONOCYTES NFR BLD AUTO: 9.2 % (ref 5–12)
NEUTROPHILS # BLD AUTO: 2.16 10*3/MM3 (ref 1.7–7)
NEUTROPHILS NFR BLD AUTO: 52.3 % (ref 42.7–76)
NRBC BLD AUTO-RTO: 0 /100 WBC (ref 0–0.2)
PLATELET # BLD AUTO: 204 10*3/MM3 (ref 140–450)
POTASSIUM SERPL-SCNC: 4.5 MMOL/L (ref 3.5–5.2)
PROT SERPL-MCNC: 6.8 G/DL (ref 6–8.5)
RBC # BLD AUTO: 4.22 10*6/MM3 (ref 3.77–5.28)
SODIUM SERPL-SCNC: 142 MMOL/L (ref 136–145)
TRIGL SERPL-MCNC: 97 MG/DL (ref 0–150)
VLDLC SERPL CALC-MCNC: 16 MG/DL (ref 5–40)
WBC # BLD AUTO: 4.13 10*3/MM3 (ref 3.4–10.8)

## 2023-02-13 DIAGNOSIS — K21.9 GASTROESOPHAGEAL REFLUX DISEASE WITHOUT ESOPHAGITIS: ICD-10-CM

## 2023-02-13 RX ORDER — OMEPRAZOLE 20 MG/1
CAPSULE, DELAYED RELEASE ORAL
Qty: 90 CAPSULE | Refills: 1 | Status: SHIPPED | OUTPATIENT
Start: 2023-02-13

## 2023-05-13 DIAGNOSIS — E78.5 HYPERLIPIDEMIA, UNSPECIFIED HYPERLIPIDEMIA TYPE: ICD-10-CM

## 2023-05-15 RX ORDER — ROSUVASTATIN CALCIUM 20 MG/1
TABLET, COATED ORAL
Qty: 90 TABLET | Refills: 1 | Status: SHIPPED | OUTPATIENT
Start: 2023-05-15

## 2023-08-07 DIAGNOSIS — F41.8 MIXED ANXIETY DEPRESSIVE DISORDER: ICD-10-CM

## 2023-08-07 RX ORDER — CITALOPRAM 20 MG/1
TABLET ORAL
Qty: 90 TABLET | Refills: 3 | Status: SHIPPED | OUTPATIENT
Start: 2023-08-07

## 2023-08-23 ENCOUNTER — HOSPITAL ENCOUNTER (OUTPATIENT)
Dept: BONE DENSITY | Facility: HOSPITAL | Age: 73
Discharge: HOME | End: 2023-08-23
Admitting: STUDENT IN AN ORGANIZED HEALTH CARE EDUCATION/TRAINING PROGRAM
Payer: MEDICARE

## 2023-08-23 PROCEDURE — 77080 DXA BONE DENSITY AXIAL: CPT

## 2023-09-18 DIAGNOSIS — K21.9 GASTROESOPHAGEAL REFLUX DISEASE WITHOUT ESOPHAGITIS: ICD-10-CM

## 2023-09-18 RX ORDER — PANTOPRAZOLE SODIUM 40 MG/1
TABLET, DELAYED RELEASE ORAL
Qty: 90 TABLET | Refills: 1 | Status: SHIPPED | OUTPATIENT
Start: 2023-09-18

## 2024-01-05 ENCOUNTER — OFFICE VISIT (OUTPATIENT)
Dept: INTERNAL MEDICINE | Age: 74
End: 2024-01-05
Payer: MEDICARE

## 2024-01-05 VITALS
BODY MASS INDEX: 24.75 KG/M2 | DIASTOLIC BLOOD PRESSURE: 76 MMHG | TEMPERATURE: 97.3 F | OXYGEN SATURATION: 96 % | WEIGHT: 145 LBS | SYSTOLIC BLOOD PRESSURE: 134 MMHG | HEART RATE: 65 BPM | HEIGHT: 64 IN

## 2024-01-05 DIAGNOSIS — E78.5 HYPERLIPIDEMIA, UNSPECIFIED HYPERLIPIDEMIA TYPE: ICD-10-CM

## 2024-01-05 DIAGNOSIS — Z13.0 SCREENING FOR IRON DEFICIENCY ANEMIA: ICD-10-CM

## 2024-01-05 DIAGNOSIS — R05.1 ACUTE COUGH: ICD-10-CM

## 2024-01-05 DIAGNOSIS — Z00.00 ENCOUNTER FOR ANNUAL WELLNESS VISIT (AWV) IN MEDICARE PATIENT: Primary | ICD-10-CM

## 2024-01-05 DIAGNOSIS — Z87.891 HISTORY OF SMOKING: ICD-10-CM

## 2024-01-05 DIAGNOSIS — R73.09 ELEVATED GLUCOSE: ICD-10-CM

## 2024-01-05 LAB
ALBUMIN SERPL-MCNC: 4.5 G/DL (ref 3.5–5.2)
ALBUMIN/GLOB SERPL: 2 G/DL
ALP SERPL-CCNC: 72 U/L (ref 39–117)
ALT SERPL-CCNC: 15 U/L (ref 1–33)
AST SERPL-CCNC: 19 U/L (ref 1–32)
BASOPHILS # BLD AUTO: 0.04 10*3/MM3 (ref 0–0.2)
BASOPHILS NFR BLD AUTO: 0.8 % (ref 0–1.5)
BILIRUB SERPL-MCNC: 0.3 MG/DL (ref 0–1.2)
BUN SERPL-MCNC: 15 MG/DL (ref 8–23)
BUN/CREAT SERPL: 20.5 (ref 7–25)
CALCIUM SERPL-MCNC: 9.7 MG/DL (ref 8.6–10.5)
CHLORIDE SERPL-SCNC: 103 MMOL/L (ref 98–107)
CHOLEST SERPL-MCNC: 217 MG/DL (ref 0–200)
CHOLEST/HDLC SERPL: 2.31 {RATIO}
CO2 SERPL-SCNC: 29.7 MMOL/L (ref 22–29)
CREAT SERPL-MCNC: 0.73 MG/DL (ref 0.57–1)
EGFRCR SERPLBLD CKD-EPI 2021: 87.5 ML/MIN/1.73
EOSINOPHIL # BLD AUTO: 0.16 10*3/MM3 (ref 0–0.4)
EOSINOPHIL NFR BLD AUTO: 3.1 % (ref 0.3–6.2)
ERYTHROCYTE [DISTWIDTH] IN BLOOD BY AUTOMATED COUNT: 12.2 % (ref 12.3–15.4)
GLOBULIN SER CALC-MCNC: 2.3 GM/DL
GLUCOSE SERPL-MCNC: 82 MG/DL (ref 65–99)
HBA1C MFR BLD: 5.6 % (ref 4.8–5.6)
HCT VFR BLD AUTO: 41.2 % (ref 34–46.6)
HDLC SERPL-MCNC: 94 MG/DL (ref 40–60)
HGB BLD-MCNC: 13.5 G/DL (ref 12–15.9)
IMM GRANULOCYTES # BLD AUTO: 0.01 10*3/MM3 (ref 0–0.05)
IMM GRANULOCYTES NFR BLD AUTO: 0.2 % (ref 0–0.5)
LDLC SERPL CALC-MCNC: 108 MG/DL (ref 0–100)
LYMPHOCYTES # BLD AUTO: 1.65 10*3/MM3 (ref 0.7–3.1)
LYMPHOCYTES NFR BLD AUTO: 32.1 % (ref 19.6–45.3)
MCH RBC QN AUTO: 31.5 PG (ref 26.6–33)
MCHC RBC AUTO-ENTMCNC: 32.8 G/DL (ref 31.5–35.7)
MCV RBC AUTO: 96 FL (ref 79–97)
MONOCYTES # BLD AUTO: 0.52 10*3/MM3 (ref 0.1–0.9)
MONOCYTES NFR BLD AUTO: 10.1 % (ref 5–12)
NEUTROPHILS # BLD AUTO: 2.76 10*3/MM3 (ref 1.7–7)
NEUTROPHILS NFR BLD AUTO: 53.7 % (ref 42.7–76)
NRBC BLD AUTO-RTO: 0 /100 WBC (ref 0–0.2)
PLATELET # BLD AUTO: 214 10*3/MM3 (ref 140–450)
POTASSIUM SERPL-SCNC: 4.4 MMOL/L (ref 3.5–5.2)
PROT SERPL-MCNC: 6.8 G/DL (ref 6–8.5)
RBC # BLD AUTO: 4.29 10*6/MM3 (ref 3.77–5.28)
SODIUM SERPL-SCNC: 140 MMOL/L (ref 136–145)
TRIGL SERPL-MCNC: 89 MG/DL (ref 0–150)
VLDLC SERPL CALC-MCNC: 15 MG/DL (ref 5–40)
WBC # BLD AUTO: 5.14 10*3/MM3 (ref 3.4–10.8)

## 2024-01-05 RX ORDER — DEXTROMETHORPHAN HYDROBROMIDE AND PROMETHAZINE HYDROCHLORIDE 15; 6.25 MG/5ML; MG/5ML
5 SYRUP ORAL 4 TIMES DAILY PRN
Qty: 118 ML | Refills: 0 | Status: SHIPPED | OUTPATIENT
Start: 2024-01-05 | End: 2024-01-12

## 2024-01-05 NOTE — PROGRESS NOTES
I N T E R N A L  M E D I C I N E  KETTY APARICIO      ENCOUNTER DATE:  01/05/2024    Mariannetam Carrera / 73 y.o. / female        MEDICARE ANNUAL WELLNESS VISIT       Chief Complaint: Medicare Wellness-subsequent       Patient's general assessment of her health since a year ago:     - Compared to one year ago, she feels her physical health is about the same without significant change.    - Compared to one year ago, she feels her mental health is about the same without significant change.      HPI for other active medical problems:     Anxiety/ Depression: Symptoms well controlled on citalopram 20 mg daily..  No SI/ HI.     HLD: Remains on Rosuvastatin 20 mg daily.  December 2022 Lipid panel with elevated , normal triglycerides 97.    GERD: Pantoprazole 40 mg daily with good symptom control.  No early satiety/ dysphagia.       Remains on aspirin 81 mg daily as CAD prevention without signs/ symptoms of bleeding.  No personal hx of stroke/ CAD.  Family hx significant for stroke in dad.      Up to date on colonoscopy.  Next due in 2027.     August 2023 DEXA showed osteopenia.  She is taking daily Vitamin D3 1000 IU and ensuring she is getting calcium 1200 mg through diet.  Mammogram updated by Dr. Vicente DOZIER.  12/16/2022 Pap was negative.     Followed by Margarito Roy, dermatology, once a year.  Personal hx of basal cell CA.    Tinnitus in left ear.  Started after COVID-19 in June 2023.  She denies any changes in hearing.  No recent hearing exam.    She reports non productive cough since Sunday, along with some associated rhinorrhea.  Otherwise, does not feel bad.  No fever, chills, chest pain, dyspnea.  Has tested negative at home for COVID-19.          HISTORY       Recent Hospitalizations:    Recent hospitalization?:     If YES, location, date, and diagnoses:     Location:   Date:   Principle Discharge Dx:   Secondary Dx:       Patient Care Team:    Patient Care Team:  Hazel Estrada APRN as PCP - General  (Family Medicine)  Barbara Zhang MD (Inactive) as Consulting Physician (Gynecology)      Allergies:  Codeine    Medications:  Current Outpatient Medications on File Prior to Visit   Medication Sig Dispense Refill    aspirin 81 MG tablet Take  by mouth.      Calcium Carb-Cholecalciferol 600-200 MG-UNIT tablet Take  by mouth.      citalopram (CeleXA) 20 MG tablet TAKE 1 TABLET EVERY DAY 90 tablet 3    pantoprazole (PROTONIX) 40 MG EC tablet TAKE 1 TABLET EVERY DAY 90 tablet 1    rosuvastatin (CRESTOR) 20 MG tablet TAKE 1 TABLET EVERY DAY 90 tablet 1     No current facility-administered medications on file prior to visit.        PFSH:     The following portions of the patient's history were reviewed and updated as appropriate: Allergies / Current Medications / Past Medical History / Surgical History / Social History / Family History    Problem List:  Patient Active Problem List   Diagnosis    Carpal tunnel syndrome    Mixed anxiety depressive disorder    Gastroesophageal reflux disease    Hyperlipidemia    Chronic insomnia    Menopause present    Osteopenia    Raynaud's phenomenon    Screening for iron deficiency anemia    Fatigue    Arthritis    Vitamin B deficiency    Chronic bilateral low back pain without sciatica    History of basal cell cancer       Past Medical History:  Past Medical History:   Diagnosis Date    Anxiety     Cataract 2018    Depression     GERD (gastroesophageal reflux disease) 2010    Hormone imbalance     Hyperlipidemia 2020    Osteopenia 2019       Past Surgical History:  Past Surgical History:   Procedure Laterality Date    COLONOSCOPY  2017    HAND SURGERY      HYSTERECTOMY         Social History:  Social History     Socioeconomic History    Marital status:    Tobacco Use    Smoking status: Former     Packs/day: 1.00     Years: 15.00     Additional pack years: 0.00     Total pack years: 15.00     Types: Cigarettes     Quit date: 1992     Years since quittin.0    Smokeless  "tobacco: Former   Vaping Use    Vaping Use: Never used   Substance and Sexual Activity    Alcohol use: Yes     Comment: 4-5 glasses of wine a wk., depending on what we are doing    Drug use: Never    Sexual activity: Defer       Family History:  Family History   Problem Relation Age of Onset    Hypertension Mother     Arthritis Mother     Heart attack Father     Arthritis Father     Hypertension Father     Stroke Father     Hypertension Sister     Hyperlipidemia Sister     Cancer Maternal Uncle         colon cancer         PATIENT ASSESSMENT     Vitals:  Vitals:    01/05/24 1000   BP: 134/76   Pulse: 65   Temp: 97.3 °F (36.3 °C)   SpO2: 96%   Weight: 65.8 kg (145 lb)   Height: 162.6 cm (64.02\")       BP Readings from Last 3 Encounters:   01/05/24 134/76   07/05/23 126/72   12/30/22 132/86     Wt Readings from Last 3 Encounters:   01/05/24 65.8 kg (145 lb)   07/05/23 62.6 kg (138 lb)   12/30/22 63.2 kg (139 lb 6.4 oz)      Body mass index is 24.88 kg/m².    [unfilled]        Review of Systems:    Review of Systems   Constitutional:  Negative for chills, fever and unexpected weight change.   HENT:  Positive for rhinorrhea and tinnitus (Left). Negative for ear pain and sore throat.    Respiratory:  Positive for cough. Negative for chest tightness and shortness of breath.    Cardiovascular:  Negative for chest pain, palpitations and leg swelling.   Neurological:  Negative for dizziness, weakness, light-headedness and headaches.   Psychiatric/Behavioral:  The patient is not nervous/anxious.          Physical Exam:    Physical Exam  Vitals reviewed.   Constitutional:       General: She is not in acute distress.     Appearance: Normal appearance. She is not ill-appearing, toxic-appearing or diaphoretic.   HENT:      Head: Normocephalic and atraumatic.      Right Ear: Tympanic membrane, ear canal and external ear normal. There is no impacted cerumen.      Left Ear: Tympanic membrane, ear canal and external ear normal. There is " no impacted cerumen.      Nose: Nose normal. No congestion or rhinorrhea.      Mouth/Throat:      Mouth: Mucous membranes are moist.      Pharynx: Oropharynx is clear. No oropharyngeal exudate or posterior oropharyngeal erythema.   Eyes:      Extraocular Movements: Extraocular movements intact.      Conjunctiva/sclera: Conjunctivae normal.      Pupils: Pupils are equal, round, and reactive to light.   Cardiovascular:      Rate and Rhythm: Normal rate and regular rhythm.      Heart sounds: Normal heart sounds.   Pulmonary:      Effort: Pulmonary effort is normal. No respiratory distress.      Breath sounds: Normal breath sounds.   Abdominal:      General: Bowel sounds are normal.      Palpations: Abdomen is soft.      Tenderness: There is no abdominal tenderness.   Musculoskeletal:         General: Normal range of motion.      Cervical back: Normal range of motion and neck supple.      Right lower leg: No edema.      Left lower leg: No edema.   Lymphadenopathy:      Cervical: No cervical adenopathy.   Skin:     General: Skin is warm and dry.   Neurological:      General: No focal deficit present.      Mental Status: She is alert and oriented to person, place, and time. Mental status is at baseline.   Psychiatric:         Mood and Affect: Mood normal.         Behavior: Behavior normal.         Thought Content: Thought content normal.         Judgment: Judgment normal.           Reviewed Data:    Labs:   Lab Results   Component Value Date     12/30/2022    K 4.5 12/30/2022    CALCIUM 9.1 12/30/2022    AST 26 12/30/2022    ALT 15 12/30/2022    BUN 15 12/30/2022    CREATININE 0.77 12/30/2022    CREATININE 0.79 05/23/2022    CREATININE 0.74 12/08/2020    EGFRIFNONA 78 12/08/2020    EGFRIFAFRI 94 12/08/2020       Lab Results   Component Value Date    HGBA1C 5.10 04/13/2016       Lab Results   Component Value Date     (H) 12/30/2022     (H) 05/23/2022    LDL 87 06/08/2021    HDL 97 (H) 12/30/2022    TRIG  "97 12/30/2022       Lab Results   Component Value Date    TSH 1.550 12/06/2018          Lab Results   Component Value Date    WBC 4.13 12/30/2022    HGB 14.0 12/30/2022    HGB 14.3 05/23/2022    HGB 14.0 12/08/2020     12/30/2022                 No results found for: \"PSA\"    Imaging:          Medical Tests:          Screening for Glaucoma:  Previous screening for glaucoma?: Yes      Hearing Loss Screen:  Finger Rub Hearing Test (right ear): passed  Finger Rub Hearing Test (left ear): passed      Urinary Incontinence Screen:  Episodes of urinary incontinence? : No      Depression Screen:      1/5/2024    10:00 AM   PHQ-2/PHQ-9 Depression Screening   Little Interest or Pleasure in Doing Things 0-->not at all   Feeling Down, Depressed or Hopeless 0-->not at all   PHQ-9: Brief Depression Severity Measure Score 0        PHQ-2: N/A (Has diagnosis of depression and is on treatment)    PHQ-9: 10-14 (Moderate Depression)       FUNCTIONAL, FALL RISK, & COGNITIVE SCREENING (Components below):    DATA:        1/5/2024    10:00 AM   Functional & Cognitive Status   Do you have difficulty preparing food and eating? No   Do you have difficulty bathing yourself, getting dressed or grooming yourself? No   Do you have difficulty using the toilet? No   Do you have difficulty moving around from place to place? No   Do you have trouble with steps or getting out of a bed or a chair? No   Current Diet Well Balanced Diet   Dental Exam Up to date   Eye Exam Up to date   Exercise (times per week) 0 times per week   Current Exercises Include No Regular Exercise   Do you need help using the phone?  No   Are you deaf or do you have serious difficulty hearing?  No   Do you need help to go to places out of walking distance? No   Do you need help shopping? No   Do you need help preparing meals?  No   Do you need help with housework?  No   Do you need help with laundry? No   Do you need help taking your medications? No   Do you need help " managing money? No   Do you ever drive or ride in a car without wearing a seat belt? No   Do you have difficulty concentrating, remembering or making decisions? No         A) Assessment of Functional Ability:  (Assessment of ability to perform ADL's (showering/bathing, using toilet, dressing, feeding self, moving self around) and IADL's (use telephone, shop, prepare food, housekeep, do laundry, transport independently, take medications independently, and handle finances)    Degree of functional impairment: NONE (based on assessment noted above)      B) Assessment of Fall Risk:  Fall Risk Assessment was completed, and patient is at low risk for falls.       Need for further evaluation of gait, strength, and balance? : No    Timed Up and Go (TUG):   (>= 12 seconds indicates high risk for falling)    Observable abnormalities included: Normal gait pattern       C. Assessment of Cognitive Function:    Mini-Cog Test:     1) Registration (3 objects): Yes   2) Number of objects recalled: 3   3) Clock Draw: Passed? : N/A       Further evaluation required? : No        COUNSELING       A. Identification of Health Risk Factors:    Risk factors include: cardiovascular risk factors and depression / other psychiatric problems      B. Age-Appropriate Screening Schedule:  (Refer to the list below for future screening recommendations based on patient's age, sex and/or medical conditions. Orders for these recommended tests are listed in the plan section. The patient has been provided with a written plan)    Health Maintenance Topics  Health Maintenance   Topic Date Due    LIPID PANEL  12/30/2023    COVID-19 Vaccine (5 - 2023-24 season) 01/07/2024 (Originally 9/1/2023)    ANNUAL WELLNESS VISIT  01/05/2025    MAMMOGRAM  06/12/2025    DXA SCAN  08/23/2025    PAP SMEAR  12/16/2025    TDAP/TD VACCINES (4 - Td or Tdap) 09/13/2026    COLORECTAL CANCER SCREENING  10/23/2027    HEPATITIS C SCREENING  Completed    INFLUENZA VACCINE  Completed     Pneumococcal Vaccine 65+  Completed    ZOSTER VACCINE  Completed       Health Maintenance Topics Due or Over-Due  Health Maintenance Due   Topic Date Due    LIPID PANEL  12/30/2023         C. Advanced Care Planning:    Advance Care Planning   ACP discussion was held with the patient during this visit. Patient has an advance directive in EMR which is still valid.        D. Patient Self-Management and Personalized Health Advice:    She has been provided with personalized counseling/information (including brochures/handouts) about:     -- recommended hearing testing, reducing risk for cardiovascular disease (heart, stroke, vascular), and managing depression/anxiety      She has been recommended for the following preventative services which has been performed today, will be ordered today or ordered/performed on upcoming follow-up visit:     -- OSTEOPOROSIS screening DISCUSSED, BREAST CANCER screening DISCUSSED, **COLORECTAL cancer screening (colonoscopy/Cologuard discussed or ordered), LUNG CANCER screening discussed/low dose CT ORDERED (or discussed plans), COVID-19 vaccination (and/or booster) administered/recommended/discussed, RSV vaccination administered/recommended/discussed, DIABETES screening performed (current/reviewed labs/lab ordered)      E. Miscellaneous Items:    -Aspirin use counseling: Taking ASA appropriately as indicated    -Discussed BMI with her. The BMI is in the acceptable range    -Reviewed use of high risk medication in the elderly: YES    -Reviewed for potential of harmful drug interactions in the elderly: YES        WRAP UP       Assessment & Plan:    1) MEDICARE ANNUAL WELLNESS VISIT    2) OTHER MEDICAL CONDITIONS ADDRESSED TODAY:            Problem List Items Addressed This Visit       Hyperlipidemia    Overview     Continue rosuvastatin 20 mg daily.         Relevant Medications    rosuvastatin (CRESTOR) 20 MG tablet    Other Relevant Orders    Lipid Panel With / Chol / HDL Ratio     Screening for iron deficiency anemia    Relevant Orders    CBC & Differential     Other Visit Diagnoses       Encounter for annual wellness visit (AWV) in Medicare patient    -  Primary    Acute cough        Relevant Medications    promethazine-dextromethorphan (PROMETHAZINE-DM) 6.25-15 MG/5ML syrup    History of smoking        Relevant Orders    CT Chest Low Dose Wo    Elevated glucose        Relevant Orders    Comprehensive Metabolic Panel    Hemoglobin A1c                      Orders Placed This Encounter   Procedures    CT Chest Low Dose Wo    Comprehensive Metabolic Panel    Hemoglobin A1c    Lipid Panel With / Chol / HDL Ratio    CBC & Differential       Discussion / Summary:    Offered referral to ENT for left ear tinnitus; she declined.  She will proceed with updating hearing exam.  Reviewed recommendation to avoid quit rooms, use white noise machine.    For her acute cough symptoms, she is requesting Promethazine DM cough syrup as this has worked in the past.  Symptoms are most likely consistent with viral URI at this time.  She will let me know if symptoms do not improve.    Proceed with CT Chest lung cancer screening given smoking history.         Medications as of TODAY:              Current Outpatient Medications   Medication Sig Dispense Refill    aspirin 81 MG tablet Take  by mouth.      Calcium Carb-Cholecalciferol 600-200 MG-UNIT tablet Take  by mouth.      citalopram (CeleXA) 20 MG tablet TAKE 1 TABLET EVERY DAY 90 tablet 3    pantoprazole (PROTONIX) 40 MG EC tablet TAKE 1 TABLET EVERY DAY 90 tablet 1    rosuvastatin (CRESTOR) 20 MG tablet TAKE 1 TABLET EVERY DAY 90 tablet 1    promethazine-dextromethorphan (PROMETHAZINE-DM) 6.25-15 MG/5ML syrup Take 5 mL by mouth 4 (Four) Times a Day As Needed for Cough for up to 7 days. 118 mL 0     No current facility-administered medications for this visit.         FOLLOW-UP:            Return for 6 month chronic care, 1 year AWV.                 Future  Appointments   Date Time Provider Department Center   7/5/2024 11:30 AM Hazel Estrada APRN [facility]  [place]   7/5/2024 12:45 PM Hazel Estrada APRN [facility]  [place]           After Visit Summary (AVS) including the Personalized Prevention  Plan Services (PPPS) was either printed and given to the patient at check-out today and/or sent to [facility] for review.       @[facility]@

## 2024-01-24 DIAGNOSIS — F41.8 MIXED ANXIETY DEPRESSIVE DISORDER: ICD-10-CM

## 2024-01-24 RX ORDER — CITALOPRAM 20 MG/1
20 TABLET ORAL DAILY
Qty: 90 TABLET | Refills: 3 | Status: SHIPPED | OUTPATIENT
Start: 2024-01-24 | End: 2024-01-26 | Stop reason: SDUPTHER

## 2024-01-25 ENCOUNTER — HOSPITAL ENCOUNTER (OUTPATIENT)
Facility: HOSPITAL | Age: 74
Discharge: HOME | End: 2024-01-25
Payer: MEDICARE

## 2024-01-25 DIAGNOSIS — Z87.891 HISTORY OF SMOKING: ICD-10-CM

## 2024-01-25 PROCEDURE — 71271 CT THORAX LUNG CANCER SCR C-: CPT

## 2024-01-26 ENCOUNTER — TELEPHONE (OUTPATIENT)
Dept: INTERNAL MEDICINE | Age: 74
End: 2024-01-26
Payer: MEDICARE

## 2024-01-26 DIAGNOSIS — F41.8 MIXED ANXIETY DEPRESSIVE DISORDER: ICD-10-CM

## 2024-01-26 RX ORDER — CITALOPRAM 20 MG/1
20 TABLET ORAL DAILY
Qty: 90 TABLET | Refills: 1 | Status: SHIPPED | OUTPATIENT
Start: 2024-01-26

## 2024-01-26 NOTE — TELEPHONE ENCOUNTER
Caller: Marianne Carrera    Relationship: Self    Best call back number: [phone]     Requested Prescriptions:   Requested Prescriptions     Pending Prescriptions Disp Refills    citalopram (CeleXA) 20 MG tablet 90 tablet 3     Sig: Take 1 tablet by mouth Daily.        Pharmacy where request should be sent: [facility] - [address] [phone] [facility] [phone]      Last office visit with prescribing clinician: 1/5/2024   Last telemedicine visit with prescribing clinician: Visit date not found   Next office visit with prescribing clinician: 7/5/2024     Additional details provided by patient: HAS NEW MAIL ORDER PHARMACY NEEDS CHANGED TO PLEASE RESEND     Does the patient have less than a 3 day supply:  [] Yes  [x] No    Would you like a call back once the refill request has been completed: [] Yes [x] No    If the office needs to give you a call back, can they leave a voicemail: [] Yes [x] No    Marilee Cuenca Rep   01/26/24 10:34 EST

## 2024-01-30 DIAGNOSIS — Z87.891 HISTORY OF SMOKING: ICD-10-CM

## 2024-01-30 DIAGNOSIS — R91.1 LUNG NODULE SEEN ON IMAGING STUDY: Primary | ICD-10-CM

## 2024-01-30 DIAGNOSIS — I77.810 ASCENDING AORTA DILATATION: Primary | ICD-10-CM

## 2024-01-30 DIAGNOSIS — I77.810 ASCENDING AORTA DILATION: Primary | ICD-10-CM

## 2024-02-07 DIAGNOSIS — Z87.891 HISTORY OF SMOKING: ICD-10-CM

## 2024-02-07 DIAGNOSIS — R91.1 LUNG NODULE SEEN ON IMAGING STUDY: Primary | ICD-10-CM

## 2024-02-07 DIAGNOSIS — I77.810 ASCENDING AORTA DILATATION: Primary | ICD-10-CM

## 2024-02-13 ENCOUNTER — OFFICE VISIT (OUTPATIENT)
Dept: OTHER | Facility: HOSPITAL | Age: 74
End: 2024-02-13
Payer: MEDICARE

## 2024-02-13 VITALS
BODY MASS INDEX: 24.38 KG/M2 | HEART RATE: 100 BPM | WEIGHT: 142.8 LBS | SYSTOLIC BLOOD PRESSURE: 129 MMHG | HEIGHT: 64 IN | DIASTOLIC BLOOD PRESSURE: 89 MMHG

## 2024-02-13 DIAGNOSIS — R91.1 SOLITARY PULMONARY NODULE: Primary | ICD-10-CM

## 2024-02-13 PROCEDURE — 99204 OFFICE O/P NEW MOD 45 MIN: CPT | Performed by: STUDENT IN AN ORGANIZED HEALTH CARE EDUCATION/TRAINING PROGRAM

## 2024-02-15 ENCOUNTER — HOSPITAL ENCOUNTER (OUTPATIENT)
Facility: HOSPITAL | Age: 74
Discharge: HOME | End: 2024-02-15
Payer: MEDICARE

## 2024-02-15 DIAGNOSIS — I77.810 ASCENDING AORTA DILATATION: ICD-10-CM

## 2024-02-15 DIAGNOSIS — N28.9 RENAL LESION: Primary | ICD-10-CM

## 2024-02-15 PROCEDURE — 76775 US EXAM ABDO BACK WALL LIM: CPT

## 2024-02-22 ENCOUNTER — HOSPITAL ENCOUNTER (OUTPATIENT)
Facility: HOSPITAL | Age: 74
Discharge: HOME | End: 2024-02-22
Payer: MEDICARE

## 2024-02-22 DIAGNOSIS — N28.9 RENAL LESION: ICD-10-CM

## 2024-02-22 PROCEDURE — 76775 US EXAM ABDO BACK WALL LIM: CPT

## 2024-02-25 DIAGNOSIS — E78.5 HYPERLIPIDEMIA, UNSPECIFIED HYPERLIPIDEMIA TYPE: Primary | ICD-10-CM

## 2024-02-25 RX ORDER — ROSUVASTATIN CALCIUM 40 MG/1
40 TABLET, COATED ORAL DAILY
Qty: 90 TABLET | Refills: 0 | Status: SHIPPED | OUTPATIENT
Start: 2024-02-25 | End: 2024-02-26 | Stop reason: SDUPTHER

## 2024-02-26 ENCOUNTER — TELEPHONE (OUTPATIENT)
Dept: INTERNAL MEDICINE | Age: 74
End: 2024-02-26
Payer: MEDICARE

## 2024-02-26 DIAGNOSIS — E78.5 HYPERLIPIDEMIA, UNSPECIFIED HYPERLIPIDEMIA TYPE: ICD-10-CM

## 2024-02-26 RX ORDER — ROSUVASTATIN CALCIUM 40 MG/1
40 TABLET, COATED ORAL DAILY
Qty: 90 TABLET | Refills: 1 | Status: SHIPPED | OUTPATIENT
Start: 2024-02-26

## 2024-02-26 NOTE — TELEPHONE ENCOUNTER
Caller: Marianne Carrera    Relationship to patient: Self    Best call back number: [phone]      Patient is needing: PATIENT REQUESTS A CALLBACK TO DISCUSS HER LATEST IMAGING TEST RESULTS.    PLEASE ADVISE.

## 2024-04-25 ENCOUNTER — HOSPITAL ENCOUNTER (OUTPATIENT)
Facility: HOSPITAL | Age: 74
Discharge: HOME | End: 2024-04-25
Payer: MEDICARE

## 2024-04-25 DIAGNOSIS — R91.1 LUNG NODULE SEEN ON IMAGING STUDY: ICD-10-CM

## 2024-04-25 DIAGNOSIS — Z87.891 HISTORY OF SMOKING: ICD-10-CM

## 2024-04-25 PROCEDURE — 71250 CT THORAX DX C-: CPT

## 2024-04-29 DIAGNOSIS — R91.1 LUNG NODULE SEEN ON IMAGING STUDY: Primary | ICD-10-CM

## 2024-05-03 DIAGNOSIS — Z12.31 ENCOUNTER FOR SCREENING MAMMOGRAM FOR MALIGNANT NEOPLASM OF BREAST: Primary | ICD-10-CM

## 2024-05-08 ENCOUNTER — OFFICE VISIT (OUTPATIENT)
Dept: SURGERY | Facility: CLINIC | Age: 74
End: 2024-05-08
Payer: MEDICARE

## 2024-05-08 VITALS
HEIGHT: 64 IN | WEIGHT: 143.6 LBS | DIASTOLIC BLOOD PRESSURE: 84 MMHG | HEART RATE: 86 BPM | OXYGEN SATURATION: 98 % | SYSTOLIC BLOOD PRESSURE: 136 MMHG | BODY MASS INDEX: 24.52 KG/M2

## 2024-05-08 DIAGNOSIS — R91.1 SOLITARY PULMONARY NODULE: Primary | ICD-10-CM

## 2024-05-08 DIAGNOSIS — R91.8 GROUND GLASS OPACITY PRESENT ON IMAGING OF LUNG: ICD-10-CM

## 2024-05-08 PROCEDURE — 99213 OFFICE O/P EST LOW 20 MIN: CPT | Performed by: STUDENT IN AN ORGANIZED HEALTH CARE EDUCATION/TRAINING PROGRAM

## 2024-05-14 DIAGNOSIS — K21.9 GASTROESOPHAGEAL REFLUX DISEASE WITHOUT ESOPHAGITIS: ICD-10-CM

## 2024-05-14 DIAGNOSIS — E78.5 HYPERLIPIDEMIA, UNSPECIFIED HYPERLIPIDEMIA TYPE: ICD-10-CM

## 2024-05-14 RX ORDER — PANTOPRAZOLE SODIUM 40 MG/1
40 TABLET, DELAYED RELEASE ORAL DAILY
Qty: 90 TABLET | Refills: 1 | Status: SHIPPED | OUTPATIENT
Start: 2024-05-14

## 2024-05-14 RX ORDER — ROSUVASTATIN CALCIUM 40 MG/1
40 TABLET, COATED ORAL DAILY
Qty: 90 TABLET | Refills: 1 | Status: SHIPPED | OUTPATIENT
Start: 2024-05-14

## 2024-06-11 ENCOUNTER — TELEPHONE (OUTPATIENT)
Dept: INTERNAL MEDICINE | Age: 74
End: 2024-06-11
Payer: MEDICARE

## 2024-06-13 ENCOUNTER — APPOINTMENT (OUTPATIENT)
Dept: WOMENS IMAGING | Facility: HOSPITAL | Age: 74
End: 2024-06-13
Payer: MEDICARE

## 2024-06-13 PROCEDURE — 77067 SCR MAMMO BI INCL CAD: CPT | Performed by: RADIOLOGY

## 2024-06-13 PROCEDURE — 77063 BREAST TOMOSYNTHESIS BI: CPT | Performed by: RADIOLOGY

## 2024-07-08 ENCOUNTER — OFFICE VISIT (OUTPATIENT)
Dept: INTERNAL MEDICINE | Age: 74
End: 2024-07-08
Payer: MEDICARE

## 2024-07-08 VITALS
WEIGHT: 142 LBS | RESPIRATION RATE: 16 BRPM | OXYGEN SATURATION: 99 % | DIASTOLIC BLOOD PRESSURE: 74 MMHG | TEMPERATURE: 97 F | SYSTOLIC BLOOD PRESSURE: 115 MMHG | BODY MASS INDEX: 24.24 KG/M2 | HEIGHT: 64 IN | HEART RATE: 88 BPM

## 2024-07-08 DIAGNOSIS — F41.8 MIXED ANXIETY DEPRESSIVE DISORDER: ICD-10-CM

## 2024-07-08 DIAGNOSIS — E78.5 HYPERLIPIDEMIA, UNSPECIFIED HYPERLIPIDEMIA TYPE: Primary | ICD-10-CM

## 2024-07-08 PROCEDURE — 1160F RVW MEDS BY RX/DR IN RCRD: CPT

## 2024-07-08 PROCEDURE — G2211 COMPLEX E/M VISIT ADD ON: HCPCS

## 2024-07-08 PROCEDURE — 1126F AMNT PAIN NOTED NONE PRSNT: CPT

## 2024-07-08 PROCEDURE — 99214 OFFICE O/P EST MOD 30 MIN: CPT

## 2024-07-08 PROCEDURE — 1159F MED LIST DOCD IN RCRD: CPT

## 2024-07-08 NOTE — PROGRESS NOTES
"    I N T E R N A L  M E D I C I N E  Hazel Estrada, APRN    ENCOUNTER DATE:  07/08/2024    Mariannetam Carrera / 73 y.o. / female      CHIEF COMPLAINT / REASON FOR OFFICE VISIT     Hyperlipidemia      ASSESSMENT & PLAN     Diagnoses and all orders for this visit:    1. Hyperlipidemia, unspecified hyperlipidemia type (Primary)  Overview:  Continue rosuvastatin 40 mg daily.    Orders:  -     Comprehensive Metabolic Panel  -     Lipid Panel With / Chol / HDL Ratio    2. Mixed anxiety depressive disorder  Overview:  Continue citalopram 20 mg daily.           SUMMARY/DISCUSSION  Update fasting lipid panel/ CMP to verify LDL is < 100.  Continue low fat diet/ regular exercise as tolerated.    Follow up with repeat CT Chest/ cardiothoracic specialist as scheduled.        Next Appointment with me: Visit date not found    Return in about 26 weeks (around 1/6/2025) for Medicare Wellness.      VITAL SIGNS     Visit Vitals  /74   Pulse 88   Temp 97 °F (36.1 °C)   Resp 16   Ht 162.6 cm (64.02\")   Wt 64.4 kg (142 lb)   SpO2 99%   BMI 24.36 kg/m²             Wt Readings from Last 3 Encounters:   07/08/24 64.4 kg (142 lb)   05/08/24 65.1 kg (143 lb 9.6 oz)   02/13/24 64.8 kg (142 lb 12.8 oz)     Body mass index is 24.36 kg/m².        MEDICATIONS AT THE TIME OF OFFICE VISIT     Current Outpatient Medications on File Prior to Visit   Medication Sig Dispense Refill    aspirin 81 MG tablet Take  by mouth.      Calcium Carb-Cholecalciferol 600-200 MG-UNIT tablet Take  by mouth.      citalopram (CeleXA) 20 MG tablet Take 1 tablet by mouth Daily. 90 tablet 1    pantoprazole (PROTONIX) 40 MG EC tablet Take 1 tablet by mouth Daily. 90 tablet 1    rosuvastatin (CRESTOR) 40 MG tablet Take 1 tablet by mouth Daily. 90 tablet 1     No current facility-administered medications on file prior to visit.        HISTORY OF PRESENT ILLNESS     Anxiety/ Depression: Symptoms well controlled on citalopram 20 mg daily. No SI/ HI.     HLD: On rosuvastatin 40 " mg daily since January 2024.  Tolerating well, without side effects.  January 2024 lipid panel with elevated ; normal triglycerides 89.     Remains on aspirin 81 mg daily as CAD prevention without signs/ symptoms of bleeding.  No personal hx of stroke/ CAD.  Family hx significant for stroke in dad.    GERD: Pantoprazole 40 mg daily with good symptom control.  No early satiety/ dysphagia.       Up to date on colonoscopy.  Next due in 2027.     August 2023 DEXA showed osteopenia.  She is taking daily Vitamin D3 1000 IU and ensuring she is obtaining calcium 1200 mg through diet.  Mammogram updated by OBGYNDr. Zhang.  12/16/2022 Pap was negative.     Followed by Margarito Roy, dermatology, once a year.  Personal hx of basal cell CA.     Followed by cardiothoracic, Dr. Ervin Patel, for mixed density nodular opacity in the right upper lobe with prominent lymph nodes Seen on CT Chest of April 2024.  Will repeat CT Chest in October 2024.  Next appointment with Dr. Patel is November 6, 2024.  Former smoker.  No chest pain, dyspnea, cough, fever, chills.      Patient Care Team:  Hazel Estrada APRN as PCP - General (Family Medicine)  Barbara Zhang MD (Inactive) as Consulting Physician (Gynecology)    REVIEW OF SYSTEMS     Review of Systems   Constitutional:  Negative for chills, fever and unexpected weight change.   Respiratory:  Negative for cough, chest tightness and shortness of breath.    Cardiovascular:  Negative for chest pain, palpitations and leg swelling.   Neurological:  Negative for dizziness, weakness, light-headedness and headaches.   Psychiatric/Behavioral:  The patient is not nervous/anxious.           PHYSICAL EXAMINATION     Physical Exam  Vitals reviewed.   Constitutional:       General: She is not in acute distress.     Appearance: Normal appearance. She is not ill-appearing, toxic-appearing or diaphoretic.   HENT:      Head: Normocephalic and atraumatic.   Cardiovascular:      Rate and Rhythm:  Normal rate and regular rhythm.      Heart sounds: Normal heart sounds.   Pulmonary:      Effort: Pulmonary effort is normal.      Breath sounds: Normal breath sounds.   Neurological:      Mental Status: She is alert and oriented to person, place, and time. Mental status is at baseline.   Psychiatric:         Mood and Affect: Mood normal.         Behavior: Behavior normal.         Thought Content: Thought content normal.         Judgment: Judgment normal.           REVIEWED DATA     Labs:           Imaging:            Medical Tests:           Summary of old records / correspondence / consultant report:           Request outside records:

## 2024-07-09 LAB
ALBUMIN SERPL-MCNC: 4.5 G/DL (ref 3.5–5.2)
ALBUMIN/GLOB SERPL: 2 G/DL
ALP SERPL-CCNC: 70 U/L (ref 39–117)
ALT SERPL-CCNC: 18 U/L (ref 1–33)
AST SERPL-CCNC: 28 U/L (ref 1–32)
BILIRUB SERPL-MCNC: 0.4 MG/DL (ref 0–1.2)
BUN SERPL-MCNC: 11 MG/DL (ref 8–23)
BUN/CREAT SERPL: 13.4 (ref 7–25)
CALCIUM SERPL-MCNC: 9.9 MG/DL (ref 8.6–10.5)
CHLORIDE SERPL-SCNC: 103 MMOL/L (ref 98–107)
CHOLEST SERPL-MCNC: 191 MG/DL (ref 0–200)
CHOLEST/HDLC SERPL: 2.01 {RATIO}
CO2 SERPL-SCNC: 30.3 MMOL/L (ref 22–29)
CREAT SERPL-MCNC: 0.82 MG/DL (ref 0.57–1)
EGFRCR SERPLBLD CKD-EPI 2021: 76.1 ML/MIN/1.73
GLOBULIN SER CALC-MCNC: 2.3 GM/DL
GLUCOSE SERPL-MCNC: 86 MG/DL (ref 65–99)
HDLC SERPL-MCNC: 95 MG/DL (ref 40–60)
LDLC SERPL CALC-MCNC: 82 MG/DL (ref 0–100)
POTASSIUM SERPL-SCNC: 5.2 MMOL/L (ref 3.5–5.2)
PROT SERPL-MCNC: 6.8 G/DL (ref 6–8.5)
SODIUM SERPL-SCNC: 142 MMOL/L (ref 136–145)
TRIGL SERPL-MCNC: 75 MG/DL (ref 0–150)
VLDLC SERPL CALC-MCNC: 14 MG/DL (ref 5–40)

## 2024-07-29 ENCOUNTER — TELEPHONE (OUTPATIENT)
Dept: SURGERY | Facility: CLINIC | Age: 74
End: 2024-07-29
Payer: MEDICARE

## 2024-07-29 NOTE — TELEPHONE ENCOUNTER
I left message with pt to get her rescheduled due to Dr Patel no longer being with our practice. I left my name, medical group, and call back number    DB 1:09  7/29/2024

## 2024-07-31 ENCOUNTER — TELEPHONE (OUTPATIENT)
Dept: SURGERY | Facility: CLINIC | Age: 74
End: 2024-07-31
Payer: MEDICARE

## 2024-07-31 NOTE — TELEPHONE ENCOUNTER
I was able to get a pt rescheduled from Dr Patel to Dr Griggs. Pt was agreeable and satisfied    DB 10:44  7/31/2024

## 2024-08-06 DIAGNOSIS — F41.8 MIXED ANXIETY DEPRESSIVE DISORDER: ICD-10-CM

## 2024-08-07 RX ORDER — CITALOPRAM 20 MG/1
20 TABLET ORAL DAILY
Qty: 90 TABLET | Refills: 3 | Status: SHIPPED | OUTPATIENT
Start: 2024-08-07

## 2024-10-14 DIAGNOSIS — R91.8 GROUND GLASS OPACITY PRESENT ON IMAGING OF LUNG: Primary | ICD-10-CM

## 2024-10-14 DIAGNOSIS — R91.1 SOLITARY PULMONARY NODULE: ICD-10-CM

## 2024-10-14 NOTE — PROGRESS NOTES
Patient is known to our service for her pulmonary nodule.  Was last seen by Dr. Ervin Patel on 5/8/2024 and a 6-month CT chest was recommended for continued monitoring of her lung nodules. Dr. Ervin Patel no longer with our service.  CT chest without contrast reordered for continuity of care.    KETTY Weeks  Thoracic Surgery

## 2024-10-18 DIAGNOSIS — K21.9 GASTROESOPHAGEAL REFLUX DISEASE WITHOUT ESOPHAGITIS: ICD-10-CM

## 2024-10-19 RX ORDER — PANTOPRAZOLE SODIUM 40 MG/1
40 TABLET, DELAYED RELEASE ORAL DAILY
Qty: 90 TABLET | Refills: 1 | Status: SHIPPED | OUTPATIENT
Start: 2024-10-19

## 2024-10-28 ENCOUNTER — HOSPITAL ENCOUNTER (OUTPATIENT)
Facility: HOSPITAL | Age: 74
Discharge: HOME | End: 2024-10-28
Payer: MEDICARE

## 2024-10-28 DIAGNOSIS — R91.8 GROUND GLASS OPACITY PRESENT ON IMAGING OF LUNG: ICD-10-CM

## 2024-10-28 DIAGNOSIS — R91.1 SOLITARY PULMONARY NODULE: ICD-10-CM

## 2024-10-28 PROCEDURE — 71250 CT THORAX DX C-: CPT

## 2024-11-04 ENCOUNTER — OFFICE VISIT (OUTPATIENT)
Dept: SURGERY | Facility: CLINIC | Age: 74
End: 2024-11-04
Payer: MEDICARE

## 2024-11-04 VITALS
BODY MASS INDEX: 24.45 KG/M2 | WEIGHT: 142.5 LBS | SYSTOLIC BLOOD PRESSURE: 141 MMHG | DIASTOLIC BLOOD PRESSURE: 96 MMHG | HEART RATE: 89 BPM | OXYGEN SATURATION: 96 %

## 2024-11-04 DIAGNOSIS — R91.1 SOLITARY PULMONARY NODULE: Primary | ICD-10-CM

## 2024-11-04 DIAGNOSIS — R91.8 GROUND GLASS OPACITY PRESENT ON IMAGING OF LUNG: ICD-10-CM

## 2024-11-04 NOTE — LETTER
"November 19, 2024     KETTY Azevedo  [address]  [facility] [address]  [facility] [address]    Patient: Marianne Carrera   YOB: 1950   Date of Visit: 11/4/2024     Dear KETTY Azevedo:       Thank you for referring Marianne Carrera to me for evaluation. Below are the relevant portions of my assessment and plan of care.    If you have questions, please do not hesitate to call me. I look forward to following Marianne along with you.         Sincerely,        Marisela Griggs MD        CC: No Recipients    Marisela Griggs MD  11/19/24 0902  Sign when Signing Visit  Chief Complaint  Lung nodule    Subjective       Marianne Carrera presents to [facility] THORACIC SURGERY  History of Present Illness  Ms. Carrera is a pleasant 73-year-old lady who presents in follow-up for her lung nodules.  She is in her usual state of health.  Objective  Vital Signs:  /96 (BP Location: Right arm)   Pulse 89   Wt 64.6 kg (142 lb 8 oz)   SpO2 96%   BMI 24.45 kg/m²   Estimated body mass index is 24.45 kg/m² as calculated from the following:    Height as of 7/8/24: 162.6 cm (64.02\").    Weight as of this encounter: 64.6 kg (142 lb 8 oz).    BMI is within normal parameters. No other follow-up for BMI required.      Physical Exam  Vitals and nursing note reviewed.   Constitutional:       Appearance: She is well-developed.   HENT:      Head: Normocephalic and atraumatic.      Nose: Nose normal.   Eyes:      Conjunctiva/sclera: Conjunctivae normal.   Cardiovascular:      Rate and Rhythm: Normal rate.   Pulmonary:      Effort: Pulmonary effort is normal.   Abdominal:      Palpations: Abdomen is soft.   Musculoskeletal:      Cervical back: Neck supple.   Skin:     General: Skin is warm and dry.   Neurological:      Mental Status: She is alert and oriented to person, place, and time.   Psychiatric:         Behavior: Behavior normal.         Thought Content: Thought content normal.         Judgment: Judgment normal.      "   Result Review:          I have independently reviewed the CT of the chest performed on 10/28/2024 and agree with the radiology report which demonstrates 6 mm right upper lobe nodule as well as a 1 cm right upper lobe nodule.  No new nodules.  No mediastinal or hilar lymphadenopathy.     Assessment and Plan   Diagnoses and all orders for this visit:    1. Solitary pulmonary nodule (Primary)  -     CT Chest Without Contrast; Future    2. Ground glass opacity present on imaging of lung  -     CT Chest Without Contrast; Future      Ms. Carrera is a pleasant 73-year-old lady with a 1 cm partially calcified nodule in the right upper lobe as well as a 6 mm right upper lobe nodule.  These have been stable since January 2024.  Her recent CT scan does not demonstrate any change.  I would recommend continued surveillance with a CT of the chest in 6 months per Fleischner guidelines.       Follow Up   No follow-ups on file.  Patient was given instructions and counseling regarding her condition or for health maintenance advice. Please see specific information pulled into the AVS if appropriate.

## 2024-11-04 NOTE — PROGRESS NOTES
"Chief Complaint  Lung nodule    Subjective        Marianne Carrera presents to [facility] THORACIC SURGERY  History of Present Illness  Ms. Carrera is a pleasant 73-year-old lady who presents in follow-up for her lung nodules.  She is in her usual state of health.  Objective   Vital Signs:  /96 (BP Location: Right arm)   Pulse 89   Wt 64.6 kg (142 lb 8 oz)   SpO2 96%   BMI 24.45 kg/m²   Estimated body mass index is 24.45 kg/m² as calculated from the following:    Height as of 7/8/24: 162.6 cm (64.02\").    Weight as of this encounter: 64.6 kg (142 lb 8 oz).    BMI is within normal parameters. No other follow-up for BMI required.      Physical Exam  Vitals and nursing note reviewed.   Constitutional:       Appearance: She is well-developed.   HENT:      Head: Normocephalic and atraumatic.      Nose: Nose normal.   Eyes:      Conjunctiva/sclera: Conjunctivae normal.   Cardiovascular:      Rate and Rhythm: Normal rate.   Pulmonary:      Effort: Pulmonary effort is normal.   Abdominal:      Palpations: Abdomen is soft.   Musculoskeletal:      Cervical back: Neck supple.   Skin:     General: Skin is warm and dry.   Neurological:      Mental Status: She is alert and oriented to person, place, and time.   Psychiatric:         Behavior: Behavior normal.         Thought Content: Thought content normal.         Judgment: Judgment normal.        Result Review :          I have independently reviewed the CT of the chest performed on 10/28/2024 and agree with the radiology report which demonstrates 6 mm right upper lobe nodule as well as a 1 cm right upper lobe nodule.  No new nodules.  No mediastinal or hilar lymphadenopathy.     Assessment and Plan   Diagnoses and all orders for this visit:    1. Solitary pulmonary nodule (Primary)  -     CT Chest Without Contrast; Future    2. Ground glass opacity present on imaging of lung  -     CT Chest Without Contrast; Future      Ms. Carrera is a pleasant 73-year-old " lady with a 1 cm partially calcified nodule in the right upper lobe as well as a 6 mm right upper lobe nodule.  These have been stable since January 2024.  Her recent CT scan does not demonstrate any change.  I would recommend continued surveillance with a CT of the chest in 6 months per Fleischner guidelines.       Follow Up   No follow-ups on file.  Patient was given instructions and counseling regarding her condition or for health maintenance advice. Please see specific information pulled into the AVS if appropriate.

## 2024-12-16 ENCOUNTER — OFFICE VISIT (OUTPATIENT)
Dept: OBSTETRICS AND GYNECOLOGY | Age: 74
End: 2024-12-16
Payer: MEDICARE

## 2024-12-16 ENCOUNTER — TELEPHONE (OUTPATIENT)
Dept: OBSTETRICS AND GYNECOLOGY | Age: 74
End: 2024-12-16

## 2024-12-16 VITALS
HEIGHT: 64 IN | SYSTOLIC BLOOD PRESSURE: 122 MMHG | DIASTOLIC BLOOD PRESSURE: 76 MMHG | WEIGHT: 143.4 LBS | BODY MASS INDEX: 24.48 KG/M2

## 2024-12-16 DIAGNOSIS — Z12.31 SCREENING MAMMOGRAM FOR BREAST CANCER: ICD-10-CM

## 2024-12-16 DIAGNOSIS — Z01.419 ENCOUNTER FOR GYNECOLOGICAL EXAMINATION: Primary | ICD-10-CM

## 2024-12-16 DIAGNOSIS — N95.0 PMB (POSTMENOPAUSAL BLEEDING): ICD-10-CM

## 2024-12-16 DIAGNOSIS — R35.0 URINARY FREQUENCY: ICD-10-CM

## 2024-12-16 DIAGNOSIS — N95.9 UNSPECIFIED MENOPAUSAL AND PERIMENOPAUSAL DISORDER: ICD-10-CM

## 2024-12-16 LAB
BILIRUB BLD-MCNC: NEGATIVE MG/DL
CLARITY, POC: CLEAR
COLOR UR: ABNORMAL
GLUCOSE UR STRIP-MCNC: NEGATIVE MG/DL
KETONES UR QL: NEGATIVE
LEUKOCYTE EST, POC: ABNORMAL
NITRITE UR-MCNC: NEGATIVE MG/ML
PH UR: 7 [PH] (ref 5–8)
PROT UR STRIP-MCNC: NEGATIVE MG/DL
RBC # UR STRIP: NEGATIVE /UL
SP GR UR: 1.01 (ref 1–1.03)
UROBILINOGEN UR QL: ABNORMAL

## 2024-12-16 NOTE — TELEPHONE ENCOUNTER
Caller: Marianne Carrera    Relationship to patient: Self    Best call back number: [phone] (home)      Patient CALLED BACK WITH DATES AS YOU REQUESTED:  LAST MAMMOGRAM 6/13/24 ([facility], PAP 12/9/2022

## 2024-12-16 NOTE — PROGRESS NOTES
Subjective       History of Present Illness    Chief Complaint   Patient presents with    Gynecologic Exam     New gyn Last pap 2 years (-) Hpv (-) Mg (-) 5/3/24 Dexa 23 Scope 10/23/17   Cc:pt states she having urinary frequency no pain       Marianne Carrera is a 74 y.o. female who presents for annual exam.  New patient- previous patient of Dr Beckett  Here to establish care  Had hyst in   No problems  Has PCP that she sees regularly  She has had some urinary frequency this week  No dysuria, back pain or fevers  Thinks she had a mammogram in - I can see the appt but no result- she will check with PCP  She is accustomed to doing paps every visit    Patient reports that she is not currently experiencing any symptoms of urinary incontinence.     OB History    Para Term  AB Living   0 0 0 0 0 0   SAB IAB Ectopic Molar Multiple Live Births   0 0 0 0 0 0       The following portions of the patient's history were reviewed and updated as appropriate: allergies, current medications, past family history, past medical history, past social history, past surgical history and problem list.    Current contraception: status post hysterectomy  History of abnormal Pap smear: no  Perform regular self breast exam: yes - occasional  Family history of uterine or ovarian cancer: no  Family history of breast cancer: no    Mammogram: will get results  Colonoscopy: up to date.  DEXA: up to date.      Social History    Tobacco Use      Smoking status: Former        Packs/day: 0.00        Years: 1 pack/day for 15.0 years (15.0 ttl pk-yrs)        Types: Cigarettes        Start date: 1977        Quit date: 1992        Years since quittin.9      Smokeless tobacco: Former    Exercise: moderately active  Calcium/Vitamin D: uses supplements    The following portions of the patient's history were reviewed and updated as appropriate: allergies, current medications, past family history, past medical history, past  "social history, past surgical history, and problem list.    Review of Systems   Constitutional: Negative.    HENT: Negative.     Eyes: Negative.    Respiratory: Negative.     Cardiovascular: Negative.    Gastrointestinal: Negative.    Endocrine: Negative.    Genitourinary: Negative.    Musculoskeletal: Negative.    Skin: Negative.    Allergic/Immunologic: Negative.    Neurological: Negative.    Hematological: Negative.    Psychiatric/Behavioral: Negative.           Objective   Physical Exam  Constitutional:       Appearance: She is well-developed.   Neck:      Thyroid: No thyroid mass or thyromegaly.   Cardiovascular:      Rate and Rhythm: Normal rate and regular rhythm.      Heart sounds: Normal heart sounds.   Pulmonary:      Effort: Pulmonary effort is normal.      Breath sounds: Normal breath sounds.   Chest:   Breasts:     Right: No mass, nipple discharge, skin change or tenderness.      Left: No mass, nipple discharge, skin change or tenderness.   Abdominal:      Palpations: Abdomen is soft.   Genitourinary:     Vagina: Normal.      Cervix: Normal.      Adnexa:         Right: No mass.          Left: No mass.        Rectum: Normal.   Neurological:      Mental Status: She is alert and oriented to person, place, and time.   Psychiatric:         Behavior: Behavior normal.         /76   Ht 162.6 cm (64.02\")   Wt 65 kg (143 lb 6.4 oz)   BMI 24.60 kg/m²     Assessment & Plan   Diagnoses and all orders for this visit:    1. Encounter for gynecological examination (Primary)  -     Cancel: IGP, Apt HPV,rfx 16 / 18,45  -     IGP, Apt HPV,rfx 16 / 18,45    2. Urinary frequency  -     Urine Culture - Urine, Urine, Clean Catch  -     POC Urinalysis Dipstick    3. Screening mammogram for breast cancer  -     Mammo Screening Digital Tomosynthesis Bilateral With CAD; Future    4. PMB (postmenopausal bleeding)  -     DEXA Bone Density Axial; Future    5. Unspecified menopausal and perimenopausal disorder  -     DEXA " Bone Density Axial; Future          Breast self exam technique reviewed and patient encouraged to perform self-exam monthly.  Discussed healthy lifestyle modifications.  Pap smear done with HPV  Recommended 30 minutes of aerobic exercise five times per week.  Discussed calcium needs to prevent osteoporosis- repeat Dexa next year  Plan pap/pelvic every other year  Mammogram ordered  UA today is normal, will check urine culture due to frequency  Discussed increase water intake and avoidance of bladder irritants, will call with culture results

## 2024-12-17 LAB
BACTERIA UR CULT: NO GROWTH
BACTERIA UR CULT: NORMAL

## 2024-12-23 LAB
CYTOLOGIST CVX/VAG CYTO: NORMAL
CYTOLOGY CVX/VAG DOC CYTO: NORMAL
CYTOLOGY CVX/VAG DOC THIN PREP: NORMAL
DX ICD CODE: NORMAL
HPV I/H RISK 4 DNA CVX QL PROBE+SIG AMP: NEGATIVE
Lab: NORMAL
Lab: NORMAL
OTHER STN SPEC: NORMAL
STAT OF ADQ CVX/VAG CYTO-IMP: NORMAL

## 2025-02-03 ENCOUNTER — TELEPHONE (OUTPATIENT)
Dept: INTERNAL MEDICINE | Age: 75
End: 2025-02-03
Payer: MEDICARE

## 2025-02-03 DIAGNOSIS — E78.5 HYPERLIPIDEMIA, UNSPECIFIED HYPERLIPIDEMIA TYPE: ICD-10-CM

## 2025-02-03 RX ORDER — ROSUVASTATIN CALCIUM 40 MG/1
40 TABLET, COATED ORAL DAILY
Qty: 30 TABLET | Refills: 0 | Status: SHIPPED | OUTPATIENT
Start: 2025-02-03

## 2025-02-03 NOTE — TELEPHONE ENCOUNTER
Please call.  Needs to re schedule missed AWV prior to next refill of medication (30 day refill sent today).

## 2025-02-06 NOTE — TELEPHONE ENCOUNTER
Name: Marianne Carrera      Relationship: Self      Best Callback Number: [phone]       HUB PROVIDED THE RELAY MESSAGE FROM THE OFFICE      PATIENT: SCHEDULED PER NOTE

## 2025-02-10 ENCOUNTER — OFFICE VISIT (OUTPATIENT)
Dept: INTERNAL MEDICINE | Age: 75
End: 2025-02-10
Payer: MEDICARE

## 2025-02-10 VITALS
WEIGHT: 147 LBS | OXYGEN SATURATION: 96 % | DIASTOLIC BLOOD PRESSURE: 76 MMHG | TEMPERATURE: 97.5 F | HEART RATE: 91 BPM | BODY MASS INDEX: 25.1 KG/M2 | SYSTOLIC BLOOD PRESSURE: 122 MMHG | HEIGHT: 64 IN

## 2025-02-10 DIAGNOSIS — F41.8 MIXED ANXIETY DEPRESSIVE DISORDER: ICD-10-CM

## 2025-02-10 DIAGNOSIS — E78.5 HYPERLIPIDEMIA, UNSPECIFIED HYPERLIPIDEMIA TYPE: ICD-10-CM

## 2025-02-10 DIAGNOSIS — Z00.00 ENCOUNTER FOR ANNUAL WELLNESS VISIT (AWV) IN MEDICARE PATIENT: Primary | ICD-10-CM

## 2025-02-10 DIAGNOSIS — R73.09 ELEVATED GLUCOSE: ICD-10-CM

## 2025-02-10 DIAGNOSIS — Z13.0 SCREENING FOR IRON DEFICIENCY ANEMIA: ICD-10-CM

## 2025-02-10 PROCEDURE — 1159F MED LIST DOCD IN RCRD: CPT

## 2025-02-10 PROCEDURE — 1170F FXNL STATUS ASSESSED: CPT

## 2025-02-10 PROCEDURE — 1160F RVW MEDS BY RX/DR IN RCRD: CPT

## 2025-02-10 PROCEDURE — 99214 OFFICE O/P EST MOD 30 MIN: CPT

## 2025-02-10 PROCEDURE — 1126F AMNT PAIN NOTED NONE PRSNT: CPT

## 2025-02-10 PROCEDURE — G0439 PPPS, SUBSEQ VISIT: HCPCS

## 2025-02-10 PROCEDURE — G2211 COMPLEX E/M VISIT ADD ON: HCPCS

## 2025-02-10 NOTE — PROGRESS NOTES
I N T E R N A L  M E D I C I KARINE E    KETTY Azevedo      ENCOUNTER DATE:  02/10/2025    Marianne VINNY Deanrde / 74 y.o. / female      MEDICARE ANNUAL WELLNESS VISIT       Chief Complaint:    Chief Complaint   Patient presents with    Medicare Wellness-subsequent         Patient's general assessment of her health since a year ago:     - Compared to one year ago, she feels her physical health is:   STABLE/SAME    - Compared to one year ago, she feels her mental health is:  STABLE/SAME    Recent Hospitalization (within past 365 days) (NO unless indicated)  No      Patient Care Team:    Patient Care Team:  Hazel Estrada APRN as PCP - General (Family Medicine)  Barbara Zhang MD (Inactive) as Consulting Physician (Gynecology)    Allergies:  Codeine    Medications:  Current Outpatient Medications on File Prior to Visit   Medication Sig Dispense Refill    aspirin 81 MG tablet Take  by mouth.      Calcium Carb-Cholecalciferol 600-200 MG-UNIT tablet Take  by mouth.      citalopram (CeleXA) 20 MG tablet TAKE 1 TABLET DAILY 90 tablet 3    pantoprazole (PROTONIX) 40 MG EC tablet TAKE 1 TABLET DAILY 90 tablet 1    rosuvastatin (CRESTOR) 40 MG tablet TAKE 1 TABLET DAILY 30 tablet 0     No current facility-administered medications on file prior to visit.          No opioid medication identified on active medication list. I have reviewed chart for other potential  high risk medication/s and harmful drug interactions in the elderly.       No opioid listed on medication list       HPI for other active medical problems:   Feels well.  No problems or concerns.    Anxiety/ Depression: Symptoms well controlled on citalopram 20 mg daily.  No SI/ HI.     HLD: On rosuvastatin 40 mg daily since January 2024.  Tolerating well, without side effects.  July 2024 lipid panel with normal LDL 82; normal triglycerides 75.     Remains on aspirin 81 mg daily as CAD prevention without signs/ symptoms of bleeding.  No personal hx of stroke/ CAD.  Family hx  significant for stroke in dad.     GERD: Pantoprazole 40 mg daily with good symptom control.  No early satiety/ dysphagia.        Up to date on colonoscopy.  Next due in 2027.     August 2023 DEXA showed osteopenia.  She is taking daily Vitamin D3 1000 IU and ensuring she is obtaining calcium 1200 mg through diet.  She will update DEXA/ mammogram this year.      Followed by Margarito Roy, dermatology, once a year - states she needs to schedule follow up.  Personal hx of basal cell CA.     Followed by cardiothoracic team for mixed density nodular opacity in the right upper lobe with prominent lymph nodes as seen on CT Chest of April 2024.  Scheduled for repeat CT Chest on April 9, 2025.  Former smoker.  No chest pain, dyspnea, cough, fever, chills.          HISTORY     PFSH:     The following portions of the patient's history were reviewed and updated as appropriate: Allergies / Current Medications / Past Medical History / Surgical History / Social History / Family History    Problem List:  Patient Active Problem List   Diagnosis    Carpal tunnel syndrome    Mixed anxiety depressive disorder    Gastroesophageal reflux disease    Hyperlipidemia    Chronic insomnia    Menopause present    Osteopenia    Raynaud's phenomenon    Screening for iron deficiency anemia    Fatigue    Arthritis    Vitamin B deficiency    Chronic bilateral low back pain without sciatica    History of basal cell cancer    Ascending aorta dilatation    Urinary frequency    Encounter for gynecological examination    Screening mammogram for breast cancer       Past Medical History:  Past Medical History:   Diagnosis Date    Anxiety     Arthritis     Reynaulds    Ascending aorta dilatation     Cataract 2018    Depression     GERD (gastroesophageal reflux disease) 2010    Hormone imbalance     Hyperlipidemia 2020    Osteopenia 2019       Past Surgical History:  Past Surgical History:   Procedure Laterality Date    COLONOSCOPY  2017    HAND SURGERY    "   HYSTERECTOMY  2008       Social History:  Social History     Socioeconomic History    Marital status:    Tobacco Use    Smoking status: Former     Current packs/day: 0.00     Average packs/day: 1 pack/day for 15.0 years (15.0 ttl pk-yrs)     Types: Cigarettes     Start date: 1977     Quit date: 1992     Years since quittin.1    Smokeless tobacco: Former   Vaping Use    Vaping status: Never Used   Substance and Sexual Activity    Alcohol use: Yes     Alcohol/week: 4.0 standard drinks of alcohol     Types: 4 Glasses of wine per week     Comment: 4-5 glasses of wine a wk., depending on what we are doing    Drug use: Never    Sexual activity: Not Currently     Partners: Male       Family History:  Family History   Problem Relation Age of Onset    Hypertension Mother     Arthritis Mother     Heart attack Father     Arthritis Father     Hypertension Father     Stroke Father     Hypertension Sister     Hyperlipidemia Sister     Hypertension Sister     Cancer Maternal Uncle         colon cancer         PATIENT ASSESSMENT     Vitals:  Vitals:    02/10/25 1518   BP: 122/76   Pulse: 91   Temp: 97.5 °F (36.4 °C)   SpO2: 96%   Weight: 66.7 kg (147 lb)   Height: 162.6 cm (64\")       BP Readings from Last 3 Encounters:   02/10/25 122/76   24 122/76   24 141/96     Wt Readings from Last 3 Encounters:   02/10/25 66.7 kg (147 lb)   24 65 kg (143 lb 6.4 oz)   24 64.6 kg (142 lb 8 oz)      Body mass index is 25.23 kg/m².    [unfilled]        Review of Systems:    Review of Systems   Constitutional:  Negative for chills, fever and unexpected weight change.   Respiratory:  Negative for cough, chest tightness and shortness of breath.    Cardiovascular:  Negative for chest pain, palpitations and leg swelling.   Neurological:  Negative for dizziness, weakness, light-headedness and headaches.   Psychiatric/Behavioral:  The patient is not nervous/anxious.        Physical Exam:    Physical " Exam  Vitals reviewed.   Constitutional:       General: She is not in acute distress.     Appearance: Normal appearance. She is not ill-appearing, toxic-appearing or diaphoretic.   HENT:      Head: Normocephalic and atraumatic.      Right Ear: Tympanic membrane, ear canal and external ear normal. There is no impacted cerumen.      Left Ear: Tympanic membrane, ear canal and external ear normal. There is no impacted cerumen.      Nose: Nose normal. No congestion or rhinorrhea.      Mouth/Throat:      Mouth: Mucous membranes are moist.      Pharynx: Oropharynx is clear. No oropharyngeal exudate or posterior oropharyngeal erythema.   Eyes:      Extraocular Movements: Extraocular movements intact.      Conjunctiva/sclera: Conjunctivae normal.      Pupils: Pupils are equal, round, and reactive to light.   Cardiovascular:      Rate and Rhythm: Normal rate and regular rhythm.      Heart sounds: Normal heart sounds.   Pulmonary:      Effort: Pulmonary effort is normal. No respiratory distress.      Breath sounds: Normal breath sounds.   Abdominal:      General: Bowel sounds are normal.      Palpations: Abdomen is soft.      Tenderness: There is no abdominal tenderness.   Musculoskeletal:         General: Normal range of motion.      Cervical back: Normal range of motion and neck supple.      Right lower leg: No edema.      Left lower leg: No edema.   Lymphadenopathy:      Cervical: No cervical adenopathy.   Skin:     General: Skin is warm and dry.   Neurological:      General: No focal deficit present.      Mental Status: She is alert and oriented to person, place, and time. Mental status is at baseline.   Psychiatric:         Mood and Affect: Mood normal.         Behavior: Behavior normal.         Thought Content: Thought content normal.         Judgment: Judgment normal.           Reviewed Data:    Labs:   Lab Results   Component Value Date     07/08/2024    K 5.2 07/08/2024    CALCIUM 9.9 07/08/2024    AST 28  "07/08/2024    ALT 18 07/08/2024    BUN 11 07/08/2024    CREATININE 0.82 07/08/2024    CREATININE 0.73 01/05/2024    CREATININE 0.77 12/30/2022    EGFRIFNONA 78 12/08/2020    EGFRIFAFRI 94 12/08/2020       Lab Results   Component Value Date    HGBA1C 5.60 01/05/2024    HGBA1C 5.10 04/13/2016       Lab Results   Component Value Date    LDL 82 07/08/2024     (H) 01/05/2024     (H) 12/30/2022    HDL 95 (H) 07/08/2024    TRIG 75 07/08/2024    CHOLHDLRATIO 2.01 07/08/2024       Lab Results   Component Value Date    TSH 1.550 12/06/2018          Lab Results   Component Value Date    WBC 5.14 01/05/2024    HGB 13.5 01/05/2024    HGB 14.0 12/30/2022    HGB 14.3 05/23/2022     01/05/2024                 No results found for: \"PSA\"    Imaging:                Medical Tests:              Summary of old records / correspondence / consultant report:             Request outside records:           SCREENING ASSESSMENT      Screening for Glaucoma:  Previous screening for glaucoma?: Yes    Hearing Loss Screen:  Finger Rub Hearing Test (right ear): Passed  Finger Rub Hearing Test (left ear): Passed    Urinary Incontinence Screen:  Episodes of urinary incontinence? : No    Depression Screen:      2/10/2025     3:21 PM   PHQ-2/PHQ-9 Depression Screening   Little interest or pleasure in doing things Not at all   Feeling down, depressed, or hopeless Not at all   How difficult have these problems made it for you to do your work, take care of things at home, or get along with other people? Not difficult at all        PHQ-2: 0 (Not depressed)    PHQ-9: 0 (Negative screening for depression)         FUNCTIONAL, FALL RISK, & COGNITIVE SCREENING (components below):     A) Functional and cognitive status based on patient responses:        2/10/2025     3:22 PM   Functional & Cognitive Status   Do you have difficulty preparing food and eating? No   Do you have difficulty bathing yourself, getting dressed or grooming yourself? " No   Do you have difficulty using the toilet? No   Do you have difficulty moving around from place to place? No   Do you have trouble with steps or getting out of a bed or a chair? No   Current Diet Well Balanced Diet   Dental Exam Up to date   Eye Exam Up to date   Exercise (times per week) 0 times per week   Current Exercises Include No Regular Exercise   Do you need help using the phone?  No   Are you deaf or do you have serious difficulty hearing?  No   Do you need help to go to places out of walking distance? No   Do you need help shopping? No   Do you need help preparing meals?  No   Do you need help with housework?  No   Do you need help with laundry? No   Do you need help taking your medications? No   Do you need help managing money? No   Do you ever drive or ride in a car without wearing a seat belt? No   Have you felt unusual stress, anger or loneliness in the last month? No   Who do you live with? Spouse   If you need help, do you have trouble finding someone available to you? No   Have you been bothered in the last four weeks by sexual problems? No   Do you have difficulty concentrating, remembering or making decisions? No       B) Assessment of Fall Risk:    Fall Risk Assessment was completed, and patient is at low risk for falls.    Need for further evaluation of gait, strength, and balance? : NO    Timed Up and Go (TUG): 10 seconds   (>= 12 seconds indicates high risk for falling)    Observable abnormalities included:   Normal balance and gait pattern    C. Assessment of Cognitive Function:    Mini-Cog Test:     1) Registration (3 objects): YES   2) Clock Draw: Passed? : Yes   3) Number of objects recalled: 3 (MA)     Further evaluation required? : No    **OVERALL ASSESSMENT OF FUNCTIONAL ABILITY**  (Assessment of ability to perform ADL's (showering/bathing, using toilet, dressing, feeding self, moving self around) and IADL's (use telephone, shop, prepare food, housekeep, do laundry, transport  independently, take medications independently, and handle finances)    DEGREE OF FUNCTIONAL IMPAIRMENT:   NONE (based on assessment noted above)    ABILITY TO LIVE INDEPENDENTLY:    Capable of living with spouse/partner/family       COUNSELING       A. Identification of Health Risk Factors:    Risk factors include: cardiovascular risk factors      B. Age-Appropriate Screening Schedule:  (Refer to the list below for future screening recommendations based on patient's age, sex and/or medical conditions. Orders for these recommended tests are listed in the plan section. The patient has been provided with a written plan)    Health Maintenance Topics  Health Maintenance   Topic Date Due    BMI FOLLOWUP  12/08/2021    COVID-19 Vaccine (5 - 2024-25 season) 02/12/2025 (Originally 9/1/2024)    MAMMOGRAM  06/12/2025    LIPID PANEL  07/08/2025    DXA SCAN  08/23/2025    ANNUAL WELLNESS VISIT  02/10/2026    TDAP/TD VACCINES (4 - Td or Tdap) 09/13/2026    COLORECTAL CANCER SCREENING  10/23/2027    PAP SMEAR  12/16/2027    HEPATITIS C SCREENING  Completed    INFLUENZA VACCINE  Completed    Pneumococcal Vaccine 65+  Completed    ZOSTER VACCINE  Completed       Health Maintenance Topics Due or Over-Due  Health Maintenance Due   Topic Date Due    BMI FOLLOWUP  12/08/2021         C. Advanced Care Planning:    Advance Care Planning   ACP discussion was held with the patient during this visit. Patient has an advance directive in EMR which is still valid.        D. Patient Self-Management and Personalized Health Advice:    She has been provided with PERSONALIZED COUNSELING/INFORMATION (AVS educational information) about:     -- weight management      She has been recommended for the following PREVENTATIVE SERVICES which has been performed today, will be ordered today or ordered/performed on upcoming follow-up visit:     LIFESTYLE PREVENTATIVE MEASURES   EYE exam for GLAUCOMA screening recommended annually (or follow-up regularly with  eye care specialist for active glaucoma history)    CARDIOVASCULAR SCREENING   N/A / Current    CANCER SCREENING   COLORECTAL cancer: Colonoscopy/Cologuard discussed   LUNG CANCER screening: having chest CT performed for other medical reasons  SKIN CANCER: sees dermatologist regularly  BREAST CANCER screening discussed and mammogram every 1-2 years recommended    MISC SCREENING  OSTEOPOROSIS screening (DEXA ordered)  DIABETES screening performed (current/reviewed labs/lab ordered)    VACCINATION/IMMUNIZATION   COVID-19 vaccination discussed/recommended      E. Miscellaneous Items: [id number]    Aspirin use counseling: Taking ASA appropriately as indicated    Discussed BMI with her. The BMI is in the acceptable range    Reviewed use of high risk medication in the elderly: YES    Reviewed for potential of harmful drug interactions in the elderly: YES        WRAP UP       Assessment & Plan:    1) MEDICARE ANNUAL WELLNESS VISIT    2) OTHER MEDICAL CONDITIONS ADDRESSED TODAY:            Problem List Items Addressed This Visit       Mixed anxiety depressive disorder    Overview     Continue citalopram 20 mg daily.         Relevant Medications    citalopram (CeleXA) 20 MG tablet    Hyperlipidemia    Overview     Continue rosuvastatin 40 mg daily.         Relevant Medications    rosuvastatin (CRESTOR) 40 MG tablet    Other Relevant Orders    Comprehensive Metabolic Panel    Lipid Panel With / Chol / HDL Ratio    Screening for iron deficiency anemia    Relevant Orders    CBC & Differential    TSH+Free T4     Other Visit Diagnoses       Encounter for annual wellness visit (AWV) in Medicare patient    -  Primary    Elevated glucose        Relevant Orders    Comprehensive Metabolic Panel    Hemoglobin A1c    Urinalysis With Microscopic If Indicated (No Culture) - Urine, Clean Catch                      Orders Placed This Encounter   Procedures    Comprehensive Metabolic Panel    Hemoglobin A1c    Lipid Panel With / Chol / HDL  Ratio    TSH+Free T4    Urinalysis With Microscopic If Indicated (No Culture) - Urine, Clean Catch    CBC & Differential       Discussion / Summary:    Encouraged regular exercise, healthy lifestyle habits.    She will follow up with OBGYN to update DEXA/ mammogram this year.    She will update CT Chest this April 2025.    Encouraged to schedule with dermatology.      BMI is >= 25 and <30. (Overweight) The following options were offered after discussion;: exercise counseling/recommendations and nutrition counseling/recommendations    Medications as of TODAY:              Current Outpatient Medications   Medication Sig Dispense Refill    aspirin 81 MG tablet Take  by mouth.      Calcium Carb-Cholecalciferol 600-200 MG-UNIT tablet Take  by mouth.      citalopram (CeleXA) 20 MG tablet TAKE 1 TABLET DAILY 90 tablet 3    pantoprazole (PROTONIX) 40 MG EC tablet TAKE 1 TABLET DAILY 90 tablet 1    rosuvastatin (CRESTOR) 40 MG tablet TAKE 1 TABLET DAILY 30 tablet 0     No current facility-administered medications for this visit.         FOLLOW-UP:            Return for Lab only appointment this week or next, 6 month chronic care, 1 year AWV.              Future Appointments   Date Time Provider Department Center   2/17/2025 10:00 AM [facility]   4/9/2025  3:00 PM [facility] CT 1 [facility] CT BR None   4/17/2025  1:00 PM Piedad Hernandez DNP APRN [facility]   8/11/2025 10:45 AM Hazel Estrada APRN [facility]   2/12/2026  3:30 PM Hazel Estrada APRN [facility]           After Visit Summary (AVS) including the Personalized Prevention  Plan Services (PPPS) was either printed and given to the patient at check-out today and/or sent to [facility] for review.

## 2025-02-17 LAB
ALBUMIN SERPL-MCNC: 4.2 G/DL (ref 3.5–5.2)
ALBUMIN/GLOB SERPL: 1.5 G/DL
ALP SERPL-CCNC: 78 U/L (ref 39–117)
ALT SERPL-CCNC: 16 U/L (ref 1–33)
AST SERPL-CCNC: 25 U/L (ref 1–32)
BASOPHILS # BLD AUTO: 0.04 10*3/MM3 (ref 0–0.2)
BASOPHILS NFR BLD AUTO: 0.8 % (ref 0–1.5)
BILIRUB SERPL-MCNC: 0.4 MG/DL (ref 0–1.2)
BUN SERPL-MCNC: 12 MG/DL (ref 8–23)
BUN/CREAT SERPL: 14.5 (ref 7–25)
CALCIUM SERPL-MCNC: 9.5 MG/DL (ref 8.6–10.5)
CHLORIDE SERPL-SCNC: 102 MMOL/L (ref 98–107)
CHOLEST SERPL-MCNC: 223 MG/DL (ref 0–200)
CHOLEST/HDLC SERPL: 2.32 {RATIO}
CO2 SERPL-SCNC: 29.6 MMOL/L (ref 22–29)
CREAT SERPL-MCNC: 0.83 MG/DL (ref 0.57–1)
EGFRCR SERPLBLD CKD-EPI 2021: 74.5 ML/MIN/1.73
EOSINOPHIL # BLD AUTO: 0.17 10*3/MM3 (ref 0–0.4)
EOSINOPHIL NFR BLD AUTO: 3.6 % (ref 0.3–6.2)
ERYTHROCYTE [DISTWIDTH] IN BLOOD BY AUTOMATED COUNT: 12.5 % (ref 12.3–15.4)
GLOBULIN SER CALC-MCNC: 2.8 GM/DL
GLUCOSE SERPL-MCNC: 83 MG/DL (ref 65–99)
HBA1C MFR BLD: 5.4 % (ref 4.8–5.6)
HCT VFR BLD AUTO: 43.9 % (ref 34–46.6)
HDLC SERPL-MCNC: 96 MG/DL (ref 40–60)
HGB BLD-MCNC: 14.8 G/DL (ref 12–15.9)
IMM GRANULOCYTES # BLD AUTO: 0.01 10*3/MM3 (ref 0–0.05)
IMM GRANULOCYTES NFR BLD AUTO: 0.2 % (ref 0–0.5)
LDLC SERPL CALC-MCNC: 107 MG/DL (ref 0–100)
LYMPHOCYTES # BLD AUTO: 1.49 10*3/MM3 (ref 0.7–3.1)
LYMPHOCYTES NFR BLD AUTO: 31.6 % (ref 19.6–45.3)
MCH RBC QN AUTO: 33.1 PG (ref 26.6–33)
MCHC RBC AUTO-ENTMCNC: 33.7 G/DL (ref 31.5–35.7)
MCV RBC AUTO: 98.2 FL (ref 79–97)
MONOCYTES # BLD AUTO: 0.44 10*3/MM3 (ref 0.1–0.9)
MONOCYTES NFR BLD AUTO: 9.3 % (ref 5–12)
NEUTROPHILS # BLD AUTO: 2.56 10*3/MM3 (ref 1.7–7)
NEUTROPHILS NFR BLD AUTO: 54.5 % (ref 42.7–76)
NRBC BLD AUTO-RTO: 0 /100 WBC (ref 0–0.2)
PLATELET # BLD AUTO: 232 10*3/MM3 (ref 140–450)
POTASSIUM SERPL-SCNC: 4.3 MMOL/L (ref 3.5–5.2)
PROT SERPL-MCNC: 7 G/DL (ref 6–8.5)
RBC # BLD AUTO: 4.47 10*6/MM3 (ref 3.77–5.28)
SODIUM SERPL-SCNC: 142 MMOL/L (ref 136–145)
T4 FREE SERPL-MCNC: 1.18 NG/DL (ref 0.92–1.68)
TRIGL SERPL-MCNC: 120 MG/DL (ref 0–150)
TSH SERPL DL<=0.005 MIU/L-ACNC: 1.05 UIU/ML (ref 0.27–4.2)
UNABLE TO VOID: NORMAL
VLDLC SERPL CALC-MCNC: 20 MG/DL (ref 5–40)
WBC # BLD AUTO: 4.71 10*3/MM3 (ref 3.4–10.8)

## 2025-02-20 DIAGNOSIS — D75.89 MACROCYTOSIS WITHOUT ANEMIA: Primary | ICD-10-CM

## 2025-03-01 DIAGNOSIS — E78.5 HYPERLIPIDEMIA, UNSPECIFIED HYPERLIPIDEMIA TYPE: ICD-10-CM

## 2025-03-02 RX ORDER — ROSUVASTATIN CALCIUM 40 MG/1
40 TABLET, COATED ORAL DAILY
Qty: 30 TABLET | Refills: 5 | Status: SHIPPED | OUTPATIENT
Start: 2025-03-02

## 2025-04-09 ENCOUNTER — HOSPITAL ENCOUNTER (OUTPATIENT)
Facility: HOSPITAL | Age: 75
Discharge: HOME | End: 2025-04-09
Admitting: THORACIC SURGERY (CARDIOTHORACIC VASCULAR SURGERY)
Payer: MEDICARE

## 2025-04-09 DIAGNOSIS — R91.8 GROUND GLASS OPACITY PRESENT ON IMAGING OF LUNG: ICD-10-CM

## 2025-04-09 DIAGNOSIS — R91.1 SOLITARY PULMONARY NODULE: ICD-10-CM

## 2025-04-09 PROCEDURE — 71250 CT THORAX DX C-: CPT

## 2025-04-17 ENCOUNTER — OFFICE VISIT (OUTPATIENT)
Dept: SURGERY | Facility: CLINIC | Age: 75
End: 2025-04-17
Payer: MEDICARE

## 2025-04-17 VITALS
HEIGHT: 64 IN | WEIGHT: 143.8 LBS | SYSTOLIC BLOOD PRESSURE: 152 MMHG | HEART RATE: 82 BPM | OXYGEN SATURATION: 90 % | BODY MASS INDEX: 24.55 KG/M2 | DIASTOLIC BLOOD PRESSURE: 87 MMHG

## 2025-04-17 DIAGNOSIS — R91.8 MULTIPLE PULMONARY NODULES DETERMINED BY COMPUTED TOMOGRAPHY OF LUNG: Primary | ICD-10-CM

## 2025-04-17 DIAGNOSIS — K21.9 GASTROESOPHAGEAL REFLUX DISEASE WITHOUT ESOPHAGITIS: ICD-10-CM

## 2025-04-17 DIAGNOSIS — Z87.891 FORMER SMOKER, STOPPED SMOKING MANY YEARS AGO: ICD-10-CM

## 2025-04-17 RX ORDER — PANTOPRAZOLE SODIUM 40 MG/1
40 TABLET, DELAYED RELEASE ORAL DAILY
Qty: 90 TABLET | Refills: 1 | Status: SHIPPED | OUTPATIENT
Start: 2025-04-17

## 2025-04-18 NOTE — PROGRESS NOTES
"Chief Complaint  Lung nodule    Subjective        Marianne Carrera presents to [facility] THORACIC SURGERY  History of Present Illness  Ms. Carrera is a pleasant 73-year-old lady who presents in follow-up for her lung nodules. She is a former smoker with a 15-pack-year history, having quit in the early nineties. She denies any shortness of breath, fever, chills, recent illness, or weight loss. Patient first presented in our office in May 2024 for incidentally found 1 cm nodule in her right upper lobe. Continued surveillance was recommended. She presents today with CT chest to discuss her imaging.  Objective   Vital Signs:  /87   Pulse 82   Ht 162.2 cm (63.86\")   Wt 65.2 kg (143 lb 12.8 oz)   SpO2 90%   BMI 24.79 kg/m²   Estimated body mass index is 24.79 kg/m² as calculated from the following:    Height as of this encounter: 162.2 cm (63.86\").    Weight as of this encounter: 65.2 kg (143 lb 12.8 oz).          Physical Exam  Vitals and nursing note reviewed.   Constitutional:       Appearance: She is well-developed.   HENT:      Head: Normocephalic and atraumatic.      Nose: Nose normal.   Eyes:      General: No scleral icterus.     Conjunctiva/sclera: Conjunctivae normal.   Cardiovascular:      Rate and Rhythm: Normal rate.   Pulmonary:      Effort: Pulmonary effort is normal. No respiratory distress.      Breath sounds: No wheezing.   Abdominal:      Palpations: Abdomen is soft.   Musculoskeletal:      Cervical back: Neck supple.   Skin:     General: Skin is warm and dry.   Neurological:      Mental Status: She is alert and oriented to person, place, and time.   Psychiatric:         Behavior: Behavior normal.         Thought Content: Thought content normal.         Judgment: Judgment normal.        Result Review :    CT chest 14/9/2025: No pericardial or pleural effusion, pneumothorax, or mediastinal lymphadenopathy. Irregular nodule measuring 1.1 cm similar dating back to 1/25/2024. 0.7 cm " right upper lobe nodule and punctate nodule also similar to previous scans. Bilateral groundglass opacities without change.      CT chest 10/28/2024:demonstrates 6 mm right upper lobe nodule as well as a 1 cm right upper lobe nodule.  No new nodules.  No mediastinal or hilar lymphadenopathy.     Assessment and Plan   Diagnoses and all orders for this visit:    1. Multiple pulmonary nodules determined by computed tomography of lung (Primary)  -     CT Chest Without Contrast Diagnostic; Future    2. Former smoker, stopped smoking many years ago  -     CT Chest Without Contrast Diagnostic; Future      Ms. Carrera is a pleasant 73-year-old lady with a 1.1 cm partially calcified nodule in the right upper lobe as well as a 7 mm right upper lobe nodule.  These have been stable since January 2024.  Her recent CT scan does not demonstrate any significant change.  I would recommend continued surveillance with a CT of the chest in 6 months. Will continue to follow with 6 month scans until a stability of 2-3 years can be established. Reviewed imaging and discussed plan with patient and her  and she is in agreement.        Follow Up   Return in about 6 months (around 10/17/2025).  Patient was given instructions and counseling regarding her condition or for health maintenance advice. Please see specific information pulled into the AVS if appropriate.

## 2025-05-23 ENCOUNTER — TELEPHONE (OUTPATIENT)
Dept: INTERNAL MEDICINE | Age: 75
End: 2025-05-23
Payer: MEDICARE

## 2025-05-23 DIAGNOSIS — E78.5 HYPERLIPIDEMIA, UNSPECIFIED HYPERLIPIDEMIA TYPE: ICD-10-CM

## 2025-05-23 RX ORDER — ROSUVASTATIN CALCIUM 40 MG/1
40 TABLET, COATED ORAL DAILY
Qty: 90 TABLET | Refills: 0 | Status: SHIPPED | OUTPATIENT
Start: 2025-05-23

## 2025-05-23 NOTE — TELEPHONE ENCOUNTER
Caller: Marianne Carrera    Relationship to patient: Self    Best call back number: [phone]    Patient is needing: PATIENT WOULD LIKE HER   rosuvastatin (CRESTOR) 40 MG tablet   TO BE FILLED FOR 90 DAY SUPPLY. PLEASE ADVISE.     SHE DOES HAVE REFILL REMAINING AT HER PHARMACY

## 2025-07-10 ENCOUNTER — APPOINTMENT (OUTPATIENT)
Dept: WOMENS IMAGING | Facility: HOSPITAL | Age: 75
End: 2025-07-10
Payer: MEDICARE

## 2025-07-10 ENCOUNTER — HOSPITAL ENCOUNTER (OUTPATIENT)
Dept: PET IMAGING | Facility: HOSPITAL | Age: 75
Discharge: HOME | End: 2025-07-10
Admitting: NURSE PRACTITIONER
Payer: MEDICARE

## 2025-07-10 DIAGNOSIS — N95.9 UNSPECIFIED MENOPAUSAL AND PERIMENOPAUSAL DISORDER: ICD-10-CM

## 2025-07-10 DIAGNOSIS — N95.0 PMB (POSTMENOPAUSAL BLEEDING): ICD-10-CM

## 2025-07-10 PROCEDURE — 77063 BREAST TOMOSYNTHESIS BI: CPT | Performed by: RADIOLOGY

## 2025-07-10 PROCEDURE — 77067 SCR MAMMO BI INCL CAD: CPT | Performed by: RADIOLOGY

## 2025-07-10 PROCEDURE — 77080 DXA BONE DENSITY AXIAL: CPT

## 2025-07-15 DIAGNOSIS — F41.8 MIXED ANXIETY DEPRESSIVE DISORDER: Primary | ICD-10-CM

## 2025-07-15 RX ORDER — CITALOPRAM HYDROBROMIDE 20 MG/1
20 TABLET ORAL DAILY
Qty: 90 TABLET | Refills: 0 | Status: SHIPPED | OUTPATIENT
Start: 2025-07-15

## 2025-08-11 ENCOUNTER — HOSPITAL ENCOUNTER (OUTPATIENT)
Facility: HOSPITAL | Age: 75
Discharge: HOME | End: 2025-08-11
Payer: MEDICARE

## 2025-08-11 ENCOUNTER — OFFICE VISIT (OUTPATIENT)
Dept: INTERNAL MEDICINE | Age: 75
End: 2025-08-11
Payer: MEDICARE

## 2025-08-11 VITALS
DIASTOLIC BLOOD PRESSURE: 76 MMHG | OXYGEN SATURATION: 100 % | RESPIRATION RATE: 16 BRPM | SYSTOLIC BLOOD PRESSURE: 138 MMHG | BODY MASS INDEX: 22.88 KG/M2 | HEART RATE: 80 BPM | HEIGHT: 64 IN | TEMPERATURE: 97.8 F | WEIGHT: 134 LBS

## 2025-08-11 DIAGNOSIS — M25.551 RIGHT HIP PAIN: ICD-10-CM

## 2025-08-11 DIAGNOSIS — E53.8 B12 DEFICIENCY: ICD-10-CM

## 2025-08-11 DIAGNOSIS — E78.5 HYPERLIPIDEMIA, UNSPECIFIED HYPERLIPIDEMIA TYPE: Primary | ICD-10-CM

## 2025-08-11 DIAGNOSIS — M54.16 RIGHT LUMBAR RADICULOPATHY: ICD-10-CM

## 2025-08-11 LAB
ALBUMIN SERPL-MCNC: 4.3 G/DL (ref 3.5–5.2)
ALBUMIN/GLOB SERPL: 1.9 G/DL
ALP SERPL-CCNC: 82 U/L (ref 39–117)
ALT SERPL-CCNC: 9 U/L (ref 1–33)
AST SERPL-CCNC: 17 U/L (ref 1–32)
BILIRUB SERPL-MCNC: 0.5 MG/DL (ref 0–1.2)
BUN SERPL-MCNC: 13 MG/DL (ref 8–23)
BUN/CREAT SERPL: 18.1 (ref 7–25)
CALCIUM SERPL-MCNC: 9.4 MG/DL (ref 8.6–10.5)
CHLORIDE SERPL-SCNC: 101 MMOL/L (ref 98–107)
CHOLEST SERPL-MCNC: 167 MG/DL (ref 0–200)
CHOLEST/HDLC SERPL: 2.09 {RATIO}
CO2 SERPL-SCNC: 27.3 MMOL/L (ref 22–29)
CREAT SERPL-MCNC: 0.72 MG/DL (ref 0.57–1)
EGFRCR SERPLBLD CKD-EPI 2021: 88.4 ML/MIN/1.73
GLOBULIN SER CALC-MCNC: 2.3 GM/DL
GLUCOSE SERPL-MCNC: 78 MG/DL (ref 65–99)
HDLC SERPL-MCNC: 80 MG/DL (ref 40–60)
LDLC SERPL CALC-MCNC: 70 MG/DL (ref 0–100)
POTASSIUM SERPL-SCNC: 4.7 MMOL/L (ref 3.5–5.2)
PROT SERPL-MCNC: 6.6 G/DL (ref 6–8.5)
SODIUM SERPL-SCNC: 140 MMOL/L (ref 136–145)
TRIGL SERPL-MCNC: 92 MG/DL (ref 0–150)
VIT B12 SERPL-MCNC: 537 PG/ML (ref 211–946)
VLDLC SERPL CALC-MCNC: 17 MG/DL (ref 5–40)

## 2025-08-11 PROCEDURE — 1159F MED LIST DOCD IN RCRD: CPT

## 2025-08-11 PROCEDURE — 72110 X-RAY EXAM L-2 SPINE 4/>VWS: CPT

## 2025-08-11 PROCEDURE — G2211 COMPLEX E/M VISIT ADD ON: HCPCS

## 2025-08-11 PROCEDURE — 1160F RVW MEDS BY RX/DR IN RCRD: CPT

## 2025-08-11 PROCEDURE — 99214 OFFICE O/P EST MOD 30 MIN: CPT

## 2025-08-11 PROCEDURE — 1126F AMNT PAIN NOTED NONE PRSNT: CPT

## 2025-08-11 PROCEDURE — 73502 X-RAY EXAM HIP UNI 2-3 VIEWS: CPT

## 2025-08-11 RX ORDER — LIDOCAINE 50 MG/G
1 PATCH TOPICAL EVERY 24 HOURS
Qty: 30 EACH | Refills: 0 | Status: SHIPPED | OUTPATIENT
Start: 2025-08-11

## 2025-08-11 RX ORDER — METHYLPREDNISOLONE 4 MG/1
TABLET ORAL
Qty: 21 TABLET | Refills: 0 | Status: SHIPPED | OUTPATIENT
Start: 2025-08-11

## 2025-08-12 ENCOUNTER — PRIOR AUTHORIZATION (OUTPATIENT)
Dept: INTERNAL MEDICINE | Age: 75
End: 2025-08-12
Payer: MEDICARE

## 2025-08-12 DIAGNOSIS — E78.5 HYPERLIPIDEMIA, UNSPECIFIED HYPERLIPIDEMIA TYPE: Primary | ICD-10-CM

## 2025-08-13 RX ORDER — ROSUVASTATIN CALCIUM 40 MG/1
40 TABLET, COATED ORAL DAILY
Qty: 90 TABLET | Refills: 1 | Status: SHIPPED | OUTPATIENT
Start: 2025-08-13

## 2025-08-14 DIAGNOSIS — M54.16 RIGHT LUMBAR RADICULOPATHY: Primary | ICD-10-CM

## 2025-08-14 DIAGNOSIS — M51.362 DEGENERATION OF INTERVERTEBRAL DISC OF LUMBAR REGION WITH DISCOGENIC BACK PAIN AND LOWER EXTREMITY PAIN: ICD-10-CM

## 2025-08-18 ENCOUNTER — TELEPHONE (OUTPATIENT)
Dept: INTERNAL MEDICINE | Age: 75
End: 2025-08-18
Payer: MEDICARE

## 2025-08-20 ENCOUNTER — DOCUMENTATION (OUTPATIENT)
Dept: INTERNAL MEDICINE | Age: 75
End: 2025-08-20
Payer: MEDICARE

## 2025-08-27 ENCOUNTER — OFFICE VISIT (OUTPATIENT)
Dept: ORTHOPEDIC SURGERY | Facility: CLINIC | Age: 75
End: 2025-08-27
Payer: MEDICARE

## 2025-08-27 VITALS — WEIGHT: 134 LBS | TEMPERATURE: 98.7 F | HEIGHT: 64 IN | BODY MASS INDEX: 22.88 KG/M2

## 2025-08-27 DIAGNOSIS — M54.16 LUMBAR RADICULOPATHY: Primary | ICD-10-CM

## 2025-08-27 DIAGNOSIS — R29.898 WEAKNESS OF RIGHT FOOT: ICD-10-CM

## 2025-08-27 DIAGNOSIS — M54.16 LUMBAR RADICULOPATHY: ICD-10-CM

## 2025-08-27 PROCEDURE — 99214 OFFICE O/P EST MOD 30 MIN: CPT | Performed by: NURSE PRACTITIONER

## 2025-08-27 PROCEDURE — 1160F RVW MEDS BY RX/DR IN RCRD: CPT | Performed by: NURSE PRACTITIONER

## 2025-08-27 PROCEDURE — 1159F MED LIST DOCD IN RCRD: CPT | Performed by: NURSE PRACTITIONER

## 2025-08-27 RX ORDER — TRAMADOL HYDROCHLORIDE 50 MG/1
50 TABLET ORAL EVERY 4 HOURS PRN
Qty: 30 TABLET | Refills: 0 | Status: SHIPPED | OUTPATIENT
Start: 2025-08-27 | End: 2025-08-27 | Stop reason: SDUPTHER

## 2025-08-27 RX ORDER — TRAMADOL HYDROCHLORIDE 50 MG/1
50 TABLET ORAL EVERY 6 HOURS PRN
Qty: 30 TABLET | Refills: 0 | Status: SHIPPED | OUTPATIENT
Start: 2025-08-27

## 2025-08-29 ENCOUNTER — RESULTS FOLLOW-UP (OUTPATIENT)
Dept: ORTHOPEDIC SURGERY | Facility: CLINIC | Age: 75
End: 2025-08-29
Payer: MEDICARE

## 2025-08-29 DIAGNOSIS — M54.16 LUMBAR RADICULOPATHY: Primary | ICD-10-CM
